# Patient Record
Sex: FEMALE | Employment: FULL TIME | URBAN - METROPOLITAN AREA
[De-identification: names, ages, dates, MRNs, and addresses within clinical notes are randomized per-mention and may not be internally consistent; named-entity substitution may affect disease eponyms.]

---

## 2018-03-12 ENCOUNTER — OFFICE VISIT (OUTPATIENT)
Dept: FAMILY MEDICINE CLINIC | Age: 58
End: 2018-03-12

## 2018-03-12 VITALS
OXYGEN SATURATION: 95 % | RESPIRATION RATE: 18 BRPM | SYSTOLIC BLOOD PRESSURE: 117 MMHG | DIASTOLIC BLOOD PRESSURE: 76 MMHG | HEIGHT: 65 IN | BODY MASS INDEX: 36.15 KG/M2 | TEMPERATURE: 98.4 F | HEART RATE: 73 BPM | WEIGHT: 217 LBS

## 2018-03-12 DIAGNOSIS — G89.29 CHRONIC LEFT SHOULDER PAIN: Primary | ICD-10-CM

## 2018-03-12 DIAGNOSIS — M25.512 CHRONIC LEFT SHOULDER PAIN: Primary | ICD-10-CM

## 2018-03-12 NOTE — PROGRESS NOTES
Chief Complaint   Patient presents with    Shoulder Pain     X3 months,no injury or trauma noted     1. Have you been to the ER, urgent care clinic since your last visit? Hospitalized since your last visit? Yes When: 3/5/18,[facility]fell and hit head    2. Have you seen or consulted any other health care providers outside of the [address] [place] [address] since your last visit? Include any pap smears or colon screening.  No

## 2018-03-12 NOTE — PATIENT INSTRUCTIONS
Shoulder Arthritis: Exercises  Your Care Instructions  Here are some examples of typical rehabilitation exercises for your condition. Start each exercise slowly. Ease off the exercise if you start to have pain. Your doctor or physical therapist will tell you when you can start these exercises and which ones will work best for you. How to do the exercises  Shoulder flexion (lying down)    To make a wand for this exercise, use a piece of PVC pipe or a broom handle with the broom removed. Make the wand about a foot wider than your shoulders. 1. Lie on your back, holding a wand with both hands. Your palms should face down as you hold the wand. 2. Keeping your elbows straight, slowly raise your arms over your head. Raise them until you feel a stretch in your shoulders, upper back, and chest.  3. Hold for 15 to 30 seconds. 4. Repeat 2 to 4 times. Shoulder rotation (lying down)    To make a wand for this exercise, use a piece of PVC pipe or a broom handle with the broom removed. Make the wand about a foot wider than your shoulders. 1. Lie on your back. Hold a wand with both hands with your elbows bent and palms up. 2. Keep your elbows close to your body, and move the wand across your body toward the sore arm. 3. Hold for 8 to 12 seconds. 4. Repeat 2 to 4 times. Shoulder internal rotation with towel    1. Hold a towel above and behind your head with the arm that is not sore. 2. With your sore arm, reach behind your back and grasp the towel. 3. With the arm above your head, pull the towel upward. Do this until you feel a stretch on the front and outside of your sore shoulder. 4. Hold 15 to 30 seconds. 5. Repeat 2 to 4 times. Shoulder blade squeeze    1. Stand with your arms at your sides, and squeeze your shoulder blades together. Do not raise your shoulders up as you squeeze. 2. Hold 6 seconds. 3. Repeat 8 to 12 times.   Resisted rows    For this exercise, you will need elastic exercise material, such as surgical tubing or Thera-Band. 1. Put the band around a solid object at about waist level. (A bedpost will work well.) Each hand should hold an end of the band. 2. With your elbows at your sides and bent to 90 degrees, pull the band back. Your shoulder blades should move toward each other. Return to the starting position. 3. Repeat 8 to 12 times. External rotator strengthening exercise    1. Start by tying a piece of elastic exercise material to a doorknob. You can use surgical tubing or Thera-Band. (You may also hold one end of the band in each hand.)  2. Stand or sit with your shoulder relaxed and your elbow bent 90 degrees. Your upper arm should rest comfortably against your side. Squeeze a rolled towel between your elbow and your body for comfort. This will help keep your arm at your side. 3. Hold one end of the elastic band with the hand of the painful arm. 4. Start with your forearm across your belly. Slowly rotate the forearm out away from your body. Keep your elbow and upper arm tucked against the towel roll or the side of your body until you begin to feel tightness in your shoulder. Slowly move your arm back to where you started. 5. Repeat 8 to 12 times. Internal rotator strengthening exercise    1. Start by tying a piece of elastic exercise material to a doorknob. You can use surgical tubing or Thera-Band. 2. Stand or sit with your shoulder relaxed and your elbow bent 90 degrees. Your upper arm should rest comfortably against your side. Squeeze a rolled towel between your elbow and your body for comfort. This will help keep your arm at your side. 3. Hold one end of the elastic band in the hand of the painful arm. 4. Slowly rotate your forearm toward your body until it touches your belly. Slowly move it back to where you started. 5. Keep your elbow and upper arm firmly tucked against the towel roll or at your side. 6. Repeat 8 to 12 times.   Pendulum swing    If you have pain in your back, do not do this exercise. 1. Hold on to a table or the back of a chair with your good arm. Then bend forward a little and let your sore arm hang straight down. This exercise does not use the arm muscles. Rather, use your legs and your hips to create movement that makes your arm swing freely. 2. Use the movement from your hips and legs to guide the slightly swinging arm back and forth like a pendulum (or elephant trunk). Then guide it in circles that start small (about the size of a dinner plate). Make the circles a bit larger each day, as your pain allows. 3. Do this exercise for 5 minutes, 5 to 7 times each day. 4. As you have less pain, try bending over a little farther to do this exercise. This will increase the amount of movement at your shoulder. Follow-up care is a key part of your treatment and safety. Be sure to make and go to all appointments, and call your doctor if you are having problems. It's also a good idea to know your test results and keep a list of the medicines you take. Where can you learn more? Go to http://vaishnavi-lorna.info/. Enter H562 in the search box to learn more about \"Shoulder Arthritis: Exercises. \"  Current as of: March 21, 2017  Content Version: 11.4  © 1388-8498 Healthwise, Incorporated. Care instructions adapted under license by "[employer] (which disclaims liability or warranty for this information). If you have questions about a medical condition or this instruction, always ask your healthcare professional. [place] [address] any warranty or liability for your use of this information.     Use Aleve twice daily for 5-7 days

## 2018-03-12 NOTE — PROGRESS NOTES
HPI     Chief Complaint   Patient presents with    Shoulder Pain     X3 months,no injury or trauma noted     She is a 62 y.o. female who presents for left shoulder pain x3 months. Pain is present in the [facility] region. It is achy in character. No radiation of the pain. Pain is present everyday. Denies trauma. Pain is worse when she tries to put her coat on. Pain is 3/10. No numbness or tingling. No weakness. Has not been taking pain medications. Review of Systems   Constitutional: Negative for fatigue and fever. Respiratory: Negative for shortness of breath. Cardiovascular: Negative for chest pain. Gastrointestinal: Negative for abdominal pain. Musculoskeletal: Positive for arthralgias. Negative for myalgias, neck pain and neck stiffness. Skin: Negative for wound. Neurological: Negative for light-headedness and headaches. Physical Exam:  Visit Vitals    /76 (BP 1 Location: Right arm, BP Patient Position: Sitting)    Pulse 73    Temp 98.4 °F (36.9 °C) (Oral)    Resp 18    Ht 5' 4.5\" (1.638 m)    Wt 217 lb (98.4 kg)    LMP 07/01/2011    SpO2 95%    BMI 36.67 kg/m2     Physical Exam   Constitutional: She is oriented to person, place, and time. She appears well-developed and well-nourished. HENT:   Head: Normocephalic and atraumatic. Nose: Nose normal.   Eyes: Conjunctivae are normal.   Cardiovascular: Normal rate and regular rhythm. No murmur heard. Pulmonary/Chest: Effort normal and breath sounds normal. She has no wheezes. She has no rales. Abdominal: Soft. Bowel sounds are normal. She exhibits no distension. There is no tenderness. Musculoskeletal: She exhibits no edema. Neurological: She is alert and oriented to person, place, and time. Skin: Skin is warm and dry.      Shoulder: Left  Deformity: None    ROM:  Forward Flexion: Active: 180   Passive: 180  ER (0): Active: 45  Passive: 45  IR (0): Active: Behind the body - pain present  Abduction: Active: 180 Passive: 180    Palpation:  AC tenderness: present  SC tenderness: None  Clavicle tenderness: None  Biceps tenderness: None    Strength (0-5/5):   strength 5/5  External rotation: 5/5  Internal rotation: 5/5    Rotator Cuff Exam:  Neers sign: negative  Rocha sign: negative    Biceps/Labrum/AC Exam:  Speeds Test: Negative    Instability:  Anterior apprehension: Negative    Neuro/Vascular:  Pulses intact, no edema, and neurologically intact    C-Spine:  Cervical motion: FROM without pain. Cervical tenderness: None  Spurlings test: Negative           Assessment / Plan   Diagnoses and all orders for this visit:    1. Chronic left shoulder pain   -     XR SHOULDER RT AP/LAT MIN 2 V - Acromioclavicular osteoarthritis. - Aleve BID  - Shoulder exercises and referral to PT     Follow-up Disposition:  Return if symptoms worsen or fail to improve. I have discussed the diagnosis with the patient and the intended plan as seen in the above orders. The patient has received an after-visit summary and questions were answered concerning future plans. I have discussed medication side effects and warnings with the patient as well.     Talisha George MD  Family Medicine Resident

## 2018-03-12 NOTE — MR AVS SNAPSHOT
[address] 
[phone] Patient: Mena Plata MRN: [id number] CEK:[id number] Visit Information Date & Time Provider Department Dept. Phone Encounter #  
 3/12/2018  2:25 PM [facility], [address] [place] [address] [phone] [id number] Follow-up Instructions Return if symptoms worsen or fail to improve. Upcoming Health Maintenance Date Due FOBT Q 1 YEAR AGE 50-75 4/23/2010 BREAST CANCER SCRN MAMMOGRAM 3/29/2013 PAP AKA CERVICAL CYTOLOGY 4/1/2014 Influenza Age 5 to Adult 8/1/2017 DTaP/Tdap/Td series (2 - Td) 12/28/2026 Allergies as of 3/12/2018  Review Complete On: 3/12/2018 By: [address], MD  
 No Known Allergies Current Immunizations  Never Reviewed No immunizations on file. Not reviewed this visit You Were Diagnosed With   
  
 Codes Comments Chronic left shoulder pain    -  Primary ICD-10-CM: M25.512, Z20.07 ICD-9-CM: 719.41, 338.29 Vitals BP Pulse Temp Resp Height(growth percentile) Weight(growth percentile) 117/76 (BP 1 Location: Right arm, BP Patient Position: Sitting) 73 98.4 °F (36.9 °C) (Oral) 18 5' 4.5\" (1.638 m) 217 lb (98.4 kg) LMP SpO2 BMI OB Status Smoking Status 07/01/2011 95% 36.67 kg/m2 Postmenopausal Never Smoker Vitals History BMI and BSA Data Body Mass Index Body Surface Area  
 36.67 kg/m 2 2.12 m 2 Preferred Pharmacy Pharmacy Name Phone [facility] [place] AT [facility] [phone] Your Updated Medication List  
  
   
This list is accurate as of 3/12/18  2:53 PM.  Always use your most recent med list.  
  
  
  
  
 CLARITIN 10 mg tablet Generic drug:  loratadine Take 10 mg by mouth daily.   
  
 pscybwamf-Qtioyxhz-Acuus-W.Pet 0.25-1 % rectal cream  
 Commonly known as:  PREPARATION H MAXIMUM STRENGTH Apply  to affected area as needed for Hemorrhoids. Follow-up Instructions Return if symptoms worsen or fail to improve. To-Do List   
 03/12/2018 Imaging:  XR SHOULDER RT AP/LAT MIN 2 V Patient Instructions Shoulder Arthritis: Exercises Your Care Instructions Here are some examples of typical rehabilitation exercises for your condition. Start each exercise slowly. Ease off the exercise if you start to have pain. Your doctor or physical therapist will tell you when you can start these exercises and which ones will work best for you. How to do the exercises Shoulder flexion (lying down) To make a wand for this exercise, use a piece of PVC pipe or a broom handle with the broom removed. Make the wand about a foot wider than your shoulders. 1. Lie on your back, holding a wand with both hands. Your palms should face down as you hold the wand. 2. Keeping your elbows straight, slowly raise your arms over your head. Raise them until you feel a stretch in your shoulders, upper back, and chest. 
3. Hold for 15 to 30 seconds. 4. Repeat 2 to 4 times. Shoulder rotation (lying down) To make a wand for this exercise, use a piece of PVC pipe or a broom handle with the broom removed. Make the wand about a foot wider than your shoulders. 1. Lie on your back. Hold a wand with both hands with your elbows bent and palms up. 2. Keep your elbows close to your body, and move the wand across your body toward the sore arm. 3. Hold for 8 to 12 seconds. 4. Repeat 2 to 4 times. Shoulder internal rotation with towel 1. Hold a towel above and behind your head with the arm that is not sore. 2. With your sore arm, reach behind your back and grasp the towel. 3. With the arm above your head, pull the towel upward. Do this until you feel a stretch on the front and outside of your sore shoulder. 4. Hold 15 to 30 seconds. 5. Repeat 2 to 4 times. Shoulder blade squeeze 1. Stand with your arms at your sides, and squeeze your shoulder blades together. Do not raise your shoulders up as you squeeze. 2. Hold 6 seconds. 3. Repeat 8 to 12 times. Resisted rows For this exercise, you will need elastic exercise material, such as surgical tubing or Thera-Band. 1. Put the band around a solid object at about waist level. (A bedpost will work well.) Each hand should hold an end of the band. 2. With your elbows at your sides and bent to 90 degrees, pull the band back. Your shoulder blades should move toward each other. Return to the starting position. 3. Repeat 8 to 12 times. External rotator strengthening exercise 1. Start by tying a piece of elastic exercise material to a doorknob. You can use surgical tubing or Thera-Band. (You may also hold one end of the band in each hand.) 2. Stand or sit with your shoulder relaxed and your elbow bent 90 degrees. Your upper arm should rest comfortably against your side. Squeeze a rolled towel between your elbow and your body for comfort. This will help keep your arm at your side. 3. Hold one end of the elastic band with the hand of the painful arm. 4. Start with your forearm across your belly. Slowly rotate the forearm out away from your body. Keep your elbow and upper arm tucked against the towel roll or the side of your body until you begin to feel tightness in your shoulder. Slowly move your arm back to where you started. 5. Repeat 8 to 12 times. Internal rotator strengthening exercise 1. Start by tying a piece of elastic exercise material to a doorknob. You can use surgical tubing or Thera-Band. 2. Stand or sit with your shoulder relaxed and your elbow bent 90 degrees. Your upper arm should rest comfortably against your side. Squeeze a rolled towel between your elbow and your body for comfort. This will help keep your arm at your side. 3. Hold one end of the elastic band in the hand of the painful arm. 4. Slowly rotate your forearm toward your body until it touches your belly. Slowly move it back to where you started. 5. Keep your elbow and upper arm firmly tucked against the towel roll or at your side. 6. Repeat 8 to 12 times. Pendulum swing If you have pain in your back, do not do this exercise. 1. Hold on to a table or the back of a chair with your good arm. Then bend forward a little and let your sore arm hang straight down. This exercise does not use the arm muscles. Rather, use your legs and your hips to create movement that makes your arm swing freely. 2. Use the movement from your hips and legs to guide the slightly swinging arm back and forth like a pendulum (or elephant trunk). Then guide it in circles that start small (about the size of a dinner plate). Make the circles a bit larger each day, as your pain allows. 3. Do this exercise for 5 minutes, 5 to 7 times each day. 4. As you have less pain, try bending over a little farther to do this exercise. This will increase the amount of movement at your shoulder. Follow-up care is a key part of your treatment and safety. Be sure to make and go to all appointments, and call your doctor if you are having problems. It's also a good idea to know your test results and keep a list of the medicines you take. Where can you learn more? Go to http://vaishnavi-lorna.info/. Enter H562 in the search box to learn more about \"Shoulder Arthritis: Exercises. \" Current as of: March 21, 2017 Content Version: 11.4 © 7449-2846 [employer]. Care instructions adapted under license by [employer] (which disclaims liability or warranty for this information). If you have questions about a medical condition or this instruction, always ask your healthcare professional. [address] any warranty or liability for your use of this information. Use Aleve twice daily for 5-7 days Introducing [facility]! Chantel Randall introduces Quietyme patient portal. Now you can access parts of your medical record, email your doctor's office, and request medication refills online. 1. In your internet browser, go to [email or web address]. "[employer]/B[employer]ant 2. Click on the First Time User? Click Here link in the Sign In box. You will see the New Member Sign Up page. 3. Enter your Quietyme Access Code exactly as it appears below. You will not need to use this code after youve completed the sign-up process. If you do not sign up before the expiration date, you must request a new code. · Quietyme Access Code: [email or web address] Expires: 6/10/2018  2:24 PM 
 
4. Enter the last four digits of your Social Security Number (xxxx) and Date of Birth (mm/dd/yyyy) as indicated and click Submit. You will be taken to the next sign-up page. 5. Create a Quietyme ID. This will be your Quietyme login ID and cannot be changed, so think of one that is secure and easy to remember. 6. Create a Quietyme password. You can change your password at any time. 7. Enter your Password Reset Question and Answer. This can be used at a later time if you forget your password. 8. Enter your e-mail address. You will receive e-mail notification when new information is available in [address] 9. Click Sign Up. You can now view and download portions of your medical record. 10. Click the Download Summary menu link to download a portable copy of your medical information. If you have questions, please visit the Frequently Asked Questions section of the Quietyme website. Remember, Quietyme is NOT to be used for urgent needs. For medical emergencies, dial 911. Now available from your iPhone and Android! Please provide this summary of care documentation to your next provider. Your primary care clinician is listed as Kishore Aguilar.  If you have any questions after today's visit, please call [phone].

## 2020-02-26 NOTE — PROGRESS NOTES
Assessment and Plan   Diagnoses and all orders for this visit:    1. Obesity (BMI 30-39.9)  -     HEMOGLOBIN A1C WITH EAG; Future  -     METABOLIC PANEL, COMPREHENSIVE; Future  -     LIPID PANEL; Future  Encouraged to start exercising and watching her diet    2. Leukocytosis, unspecified type  -     CBC WITH AUTOMATED DIFF; Future  Noted on previous labs. Will repeat today    3. Elevated blood-pressure reading, without diagnosis of hypertension  Advised patient to monitor blood pressure at home. If persistently elevated, consider blood pressure medication    4. Routine adult health maintenance  [facility]. Reports having an abnormal Pap-needs to follow-up on. Has a mammo-scheduled in May. Did have a colonoscopy done but cannot remember where she had a done         Return to clinic: 1 month for blood pressure, get colonoscopy report  Lymphocytosis. . Prediabetic. Check a peripheral smear next visit. Discussed starting high intensity statin. Consider starting metformin for prediabetes. We will discuss these recommendations at next visit    Kendrick Galdamez MD  [facility]  2/27/2020    Future Appointments   Date Time Provider Shania Mejíai   2/46/1380  8:95 PM Agueda Saldaña MD [address]        History of Present Illness   Chief Complaint   Establish care    Judd Noble is a 61 y.o. female     Migraines-chronic issue. Usually occurs around her eyes. Occurs about once a month. Obesity-recently joined a gym. Interested in starting exercise    Elevated blood pressure-history of preeclampsia with her first pregnancy. Denies any chest pain, shortness of breath, lightheadedness, dizziness. Review of Systems   Constitutional: Negative for chills and fever. HENT: Negative for hearing loss. Eyes: Negative for blurred vision. Respiratory: Negative for shortness of breath.     Cardiovascular: Negative for chest pain. Gastrointestinal: Negative for abdominal pain, blood in stool, constipation, diarrhea, melena, nausea and vomiting. Genitourinary: Negative for dysuria and hematuria. Musculoskeletal: Negative for joint pain. Skin: Negative for rash. Neurological: Negative for headaches. Past Medical History   No Known Allergies     Current Outpatient Medications   Medication Sig    fluocinoNIDE (LIDEX) 0.05 % external solution Apply  to affected area two (2) times daily as needed.  omega 3-dha-epa-fish oil (FISH OIL) 100-160-1,000 mg cap Take  by mouth.  multivitamin (ONE A DAY) tablet Take 1 Tab by mouth daily.  Biotin 2,500 mcg cap Take  by mouth.  b complex vitamins tablet Take 1 Tab by mouth daily.  loratadine (CLARITIN) 10 mg tablet Take 10 mg by mouth daily. No current facility-administered medications for this visit. Patient Active Problem List   Diagnosis Code    Migraines V72.315    Hemorrhoids K64.9    Environmental allergies Z91.09    Hypercholesterolemia E78.00     Past Surgical History:   Procedure Laterality Date    HX  SECTION      x3    HX TUBAL LIGATION        Social History     Tobacco Use    Smoking status: Former Smoker    Smokeless tobacco: Never Used    Tobacco comment: quit at age 32   Substance Use Topics    Alcohol use:  Yes     Alcohol/week: 0.8 standard drinks     Types: 1 Glasses of wine per week     Comment: occas wine rare      Family History   Problem Relation Age of Onset    Stroke Mother     Elevated Lipids Mother     Heart Disease Father     Diabetes Father     Hypertension Father     Cancer Maternal Grandmother         breast cancer        Physical Exam   Vitals:       Visit Vitals  /86 (BP 1 Location: Left arm, BP Patient Position: Sitting)   Pulse 70   Temp 98.7 °F (37.1 °C) (Oral)   Resp 18   Ht 5' 4.5\" (1.638 m)   Wt 218 lb 8 oz (99.1 kg)   LMP 2011   SpO2 96%   BMI 36.93 kg/m² Physical Exam  Constitutional:       General: She is not in acute distress. Appearance: She is well-developed. HENT:      Right Ear: Tympanic membrane, ear canal and external ear normal.      Left Ear: Tympanic membrane, ear canal and external ear normal.      Nose: Nose normal.      Mouth/Throat:      Mouth: Mucous membranes are moist.      Pharynx: No posterior oropharyngeal erythema. Eyes:      Conjunctiva/sclera: Conjunctivae normal.      Pupils: Pupils are equal, round, and reactive to light. Neck:      Musculoskeletal: Neck supple. Cardiovascular:      Rate and Rhythm: Normal rate and regular rhythm. Pulses: Normal pulses. Heart sounds: No murmur. No friction rub. No gallop. Pulmonary:      Effort: No respiratory distress. Breath sounds: No wheezing or rales. Abdominal:      General: Bowel sounds are normal. There is no distension. Palpations: Abdomen is soft. There is no hepatomegaly, splenomegaly or mass. Tenderness: There is no abdominal tenderness. Hernia: No hernia is present. Skin:     General: Skin is warm. Findings: No rash. Neurological:      Mental Status: She is alert. Psychiatric:         Mood and Affect: Mood normal.         Thought Content:  Thought content normal.         Judgment: Judgment normal.          Voice recognition software is utilized and this note may contain transcription errors

## 2020-02-27 ENCOUNTER — OFFICE VISIT (OUTPATIENT)
Dept: INTERNAL MEDICINE CLINIC | Age: 60
End: 2020-02-27

## 2020-02-27 VITALS
RESPIRATION RATE: 18 BRPM | BODY MASS INDEX: 36.4 KG/M2 | WEIGHT: 218.5 LBS | TEMPERATURE: 98.7 F | OXYGEN SATURATION: 96 % | DIASTOLIC BLOOD PRESSURE: 86 MMHG | HEIGHT: 65 IN | SYSTOLIC BLOOD PRESSURE: 146 MMHG | HEART RATE: 70 BPM

## 2020-02-27 DIAGNOSIS — D72.829 LEUKOCYTOSIS, UNSPECIFIED TYPE: ICD-10-CM

## 2020-02-27 DIAGNOSIS — E66.9 OBESITY (BMI 30-39.9): ICD-10-CM

## 2020-02-27 DIAGNOSIS — R03.0 ELEVATED BLOOD-PRESSURE READING, WITHOUT DIAGNOSIS OF HYPERTENSION: ICD-10-CM

## 2020-02-27 DIAGNOSIS — Z00.00 ROUTINE ADULT HEALTH MAINTENANCE: ICD-10-CM

## 2020-02-27 DIAGNOSIS — E66.01 SEVERE OBESITY (HCC): ICD-10-CM

## 2020-02-27 DIAGNOSIS — E66.9 OBESITY (BMI 30-39.9): Primary | ICD-10-CM

## 2020-02-27 PROBLEM — E78.00 HYPERCHOLESTEROLEMIA: Status: ACTIVE | Noted: 2020-02-27

## 2020-02-27 PROBLEM — Z91.09 ENVIRONMENTAL ALLERGIES: Status: ACTIVE | Noted: 2020-02-27

## 2020-02-27 RX ORDER — FLUOCINONIDE TOPICAL SOLUTION USP, 0.05% 0.5 MG/ML
SOLUTION TOPICAL
COMMUNITY

## 2020-02-27 RX ORDER — BISMUTH SUBSALICYLATE 262 MG
1 TABLET,CHEWABLE ORAL DAILY
COMMUNITY

## 2020-02-27 RX ORDER — GLUCOSAMINE/CHONDR SU A SOD 750-600 MG
TABLET ORAL
COMMUNITY
End: 2022-08-23

## 2020-02-27 RX ORDER — MULTIVIT WITH MINERALS/HERBS
1 TABLET ORAL DAILY
COMMUNITY

## 2020-02-28 LAB
ALBUMIN SERPL-MCNC: 4.6 G/DL (ref 3.8–4.9)
ALBUMIN/GLOB SERPL: 1.7 {RATIO} (ref 1.2–2.2)
ALP SERPL-CCNC: 80 IU/L (ref 39–117)
ALT SERPL-CCNC: 12 IU/L (ref 0–32)
AST SERPL-CCNC: 16 IU/L (ref 0–40)
BASOPHILS # BLD AUTO: 0.1 X10E3/UL (ref 0–0.2)
BASOPHILS NFR BLD AUTO: 1 %
BILIRUB SERPL-MCNC: 0.4 MG/DL (ref 0–1.2)
BUN SERPL-MCNC: 9 MG/DL (ref 6–24)
BUN/CREAT SERPL: 10 (ref 9–23)
CALCIUM SERPL-MCNC: 9.9 MG/DL (ref 8.7–10.2)
CHLORIDE SERPL-SCNC: 104 MMOL/L (ref 96–106)
CHOLEST SERPL-MCNC: 314 MG/DL (ref 100–199)
CO2 SERPL-SCNC: 25 MMOL/L (ref 20–29)
CREAT SERPL-MCNC: 0.89 MG/DL (ref 0.57–1)
EOSINOPHIL # BLD AUTO: 0.1 X10E3/UL (ref 0–0.4)
EOSINOPHIL NFR BLD AUTO: 1 %
ERYTHROCYTE [DISTWIDTH] IN BLOOD BY AUTOMATED COUNT: 12.9 % (ref 11.7–15.4)
EST. AVERAGE GLUCOSE BLD GHB EST-MCNC: 120 MG/DL
GLOBULIN SER CALC-MCNC: 2.7 G/DL (ref 1.5–4.5)
GLUCOSE SERPL-MCNC: 89 MG/DL (ref 65–99)
HBA1C MFR BLD: 5.8 % (ref 4.8–5.6)
HCT VFR BLD AUTO: 42.5 % (ref 34–46.6)
HDLC SERPL-MCNC: 62 MG/DL
HGB BLD-MCNC: 14.3 G/DL (ref 11.1–15.9)
IMM GRANULOCYTES # BLD AUTO: 0 X10E3/UL (ref 0–0.1)
IMM GRANULOCYTES NFR BLD AUTO: 0 %
INTERPRETATION, 910389: NORMAL
LDLC SERPL CALC-MCNC: 212 MG/DL (ref 0–99)
LYMPHOCYTES # BLD AUTO: 4.7 X10E3/UL (ref 0.7–3.1)
LYMPHOCYTES NFR BLD AUTO: 42 %
MCH RBC QN AUTO: 29.2 PG (ref 26.6–33)
MCHC RBC AUTO-ENTMCNC: 33.6 G/DL (ref 31.5–35.7)
MCV RBC AUTO: 87 FL (ref 79–97)
MONOCYTES # BLD AUTO: 0.7 X10E3/UL (ref 0.1–0.9)
MONOCYTES NFR BLD AUTO: 6 %
NEUTROPHILS # BLD AUTO: 5.6 X10E3/UL (ref 1.4–7)
NEUTROPHILS NFR BLD AUTO: 50 %
PLATELET # BLD AUTO: 304 X10E3/UL (ref 150–450)
POTASSIUM SERPL-SCNC: 4.8 MMOL/L (ref 3.5–5.2)
PROT SERPL-MCNC: 7.3 G/DL (ref 6–8.5)
RBC # BLD AUTO: 4.9 X10E6/UL (ref 3.77–5.28)
SODIUM SERPL-SCNC: 144 MMOL/L (ref 134–144)
TRIGL SERPL-MCNC: 201 MG/DL (ref 0–149)
VLDLC SERPL CALC-MCNC: 40 MG/DL (ref 5–40)
WBC # BLD AUTO: 11.2 X10E3/UL (ref 3.4–10.8)

## 2020-02-28 NOTE — PROGRESS NOTES
Letter sent. Labs normal except for the following:  Lymphocytosis. . Prediabetic. Check a peripheral smear next visit. Discussed starting high intensity statin. Consider starting metformin for prediabetes.   We will discuss these recommendations at next visit

## 2020-12-15 ENCOUNTER — PATIENT MESSAGE (OUTPATIENT)
Dept: INTERNAL MEDICINE CLINIC | Age: 60
End: 2020-12-15

## 2021-01-06 ENCOUNTER — PATIENT MESSAGE (OUTPATIENT)
Dept: INTERNAL MEDICINE CLINIC | Age: 61
End: 2021-01-06

## 2021-03-01 NOTE — PROGRESS NOTES
Assessment and Plan   Diagnoses and all orders for this visit:    1. Well adult exam  Discussed lifestyle changes. Reports she did well when adhering to an anti-inflammatory diet. She enjoyed that diet but found it difficult due to need to prepare fresh food. Counseled on COVID-19 vaccine. Counseled on shingles vaccine. Reports she had an abnormal Pap and was supposed to go back for repeat but did not go due to Covid. Recommend following up with her OB/GYN. 2. Leukocytosis, unspecified type  -     CBC WITH AUTOMATED DIFF; Future  -     PERIPHERAL SMEAR; Future  Noted on previous labs. Feeling well today. Will repeat    3. Mixed hyperlipidemia  -     HEMOGLOBIN A1C WITH EAG; Future  -     METABOLIC PANEL, COMPREHENSIVE; Future  -     LIPID PANEL; Future  Discussed possibly starting cholesterol medications based on what her labs look like today. Discussed Crestor. 4. Encounter for screening mammogram for malignant neoplasm of breast  -     [facility] MAMMO BI SCREENING INCL CAD; Future    5. Essential hypertension  Blood pressure elevated today. Due to her preference to minimize medications, recommend a trial of lifestyle changes to see if that will help with her blood pressure. Otherwise, may need to consider blood pressure medications in the future. Discussed hydrochlorothiazide versus amlodipine    Benefits, risks, possible drug interactions, and side effects of all new medications were reviewed with the patient. Pt verbalized understanding. Return to clinic: Pending labs    An electronic signature was used to authenticate this note. Uma Alford MD  [facility]  3/2/2021    No future appointments. History of Present Illness   Chief Complaint   Physical    Michael Camara is a 61 y.o. female         Review of Systems   Constitutional: Negative for chills and fever. HENT: Negative for hearing loss. Eyes: Negative for blurred vision.    Respiratory: Negative for shortness of breath.    Cardiovascular: Negative for chest pain.   Gastrointestinal: Negative for abdominal pain, blood in stool, constipation, diarrhea, melena, nausea and vomiting.   Genitourinary: Negative for dysuria and hematuria.   Musculoskeletal: Negative for joint pain.   Skin: Negative for rash.   Neurological: Negative for headaches.        Past Medical History   No Known Allergies     Current Outpatient Medications   Medication Sig   • fluocinoNIDE (LIDEX) 0.05 % external solution Apply  to affected area two (2) times daily as needed.   • omega 3-dha-epa-fish oil (FISH OIL) 100-160-1,000 mg cap Take  by mouth.   • multivitamin (ONE A DAY) tablet Take 1 Tab by mouth daily.   • Biotin 2,500 mcg cap Take  by mouth.   • b complex vitamins tablet Take 1 Tab by mouth daily.   • loratadine (CLARITIN) 10 mg tablet Take 10 mg by mouth daily.     No current facility-administered medications for this visit.           Patient Active Problem List   Diagnosis Code   • Migraines G43.909   • Hemorrhoids K64.9   • Environmental allergies Z91.09   • Severe obesity (HCC) E66.01   • Prediabetes R73.03   • Leukocytosis, unspecified type D72.829   • Mixed hyperlipidemia E78.2   • Essential hypertension I10     Past Surgical History:   Procedure Laterality Date   • HX  SECTION      x3   • HX TUBAL LIGATION        Social History     Tobacco Use   • Smoking status: Former Smoker   • Smokeless tobacco: Never Used   • Tobacco comment: quit at age 26   Substance Use Topics   • Alcohol use: Not Currently     Comment: wine 1-2x/yr      Family History   Problem Relation Age of Onset   • Stroke Mother    • Elevated Lipids Mother    • Heart Disease Father    • Diabetes Father    • Hypertension Father    • Cancer Maternal Grandmother         breast cancer        Physical Exam   Vitals:       Visit Vitals  BP (!) 147/86   Pulse 74   Temp 97.6 °F (36.4 °C) (Oral)   Resp 20   Ht 5' 4\" (1.626 m)   Wt 226 lb (102.5 kg)  LMP 07/01/2011   SpO2 98%   BMI 38.79 kg/m²        Physical Exam  Constitutional:       General: She is not in acute distress. Appearance: She is well-developed. HENT:      Right Ear: Tympanic membrane, ear canal and external ear normal.      Left Ear: Tympanic membrane, ear canal and external ear normal.   Eyes:      Extraocular Movements: Extraocular movements intact. Conjunctiva/sclera: Conjunctivae normal.   Neck:      Musculoskeletal: Neck supple. Cardiovascular:      Rate and Rhythm: Normal rate and regular rhythm. Pulses: Normal pulses. Heart sounds: No murmur. No friction rub. No gallop. Pulmonary:      Effort: No respiratory distress. Breath sounds: No wheezing, rhonchi or rales. Abdominal:      General: Bowel sounds are normal. There is no distension. Palpations: Abdomen is soft. There is no hepatomegaly, splenomegaly or mass. Tenderness: There is no abdominal tenderness. There is no guarding. Skin:     General: Skin is warm. Findings: No rash. Neurological:      Mental Status: She is alert.

## 2021-03-02 ENCOUNTER — OFFICE VISIT (OUTPATIENT)
Dept: INTERNAL MEDICINE CLINIC | Age: 61
End: 2021-03-02
Payer: COMMERCIAL

## 2021-03-02 VITALS
HEART RATE: 74 BPM | BODY MASS INDEX: 38.58 KG/M2 | TEMPERATURE: 97.6 F | RESPIRATION RATE: 20 BRPM | DIASTOLIC BLOOD PRESSURE: 86 MMHG | OXYGEN SATURATION: 98 % | WEIGHT: 226 LBS | HEIGHT: 64 IN | SYSTOLIC BLOOD PRESSURE: 147 MMHG

## 2021-03-02 DIAGNOSIS — E78.2 MIXED HYPERLIPIDEMIA: ICD-10-CM

## 2021-03-02 DIAGNOSIS — D72.829 LEUKOCYTOSIS, UNSPECIFIED TYPE: ICD-10-CM

## 2021-03-02 DIAGNOSIS — Z00.00 WELL ADULT EXAM: Primary | ICD-10-CM

## 2021-03-02 DIAGNOSIS — I10 ESSENTIAL HYPERTENSION: ICD-10-CM

## 2021-03-02 DIAGNOSIS — Z12.31 ENCOUNTER FOR SCREENING MAMMOGRAM FOR MALIGNANT NEOPLASM OF BREAST: ICD-10-CM

## 2021-03-02 PROBLEM — R73.03 PREDIABETES: Status: ACTIVE | Noted: 2021-03-02

## 2021-03-02 LAB
COMMENT, HOLDF: NORMAL
SAMPLES BEING HELD,HOLD: NORMAL

## 2021-03-02 PROCEDURE — 99396 PREV VISIT EST AGE 40-64: CPT | Performed by: INTERNAL MEDICINE

## 2021-03-02 NOTE — PATIENT INSTRUCTIONS
We are so excited that you want to have the vaccine! It is the main way we can work our way through this pandemic. The situation is evolving on a daily basis. Currently we will not be giving the vaccine in our office. First you need to make sure you have signed up with the health department. They will alert you when your wave is ready. There will be different pharmacies that are contracted to administer the vaccine which you can go to when it is your turn. This information should be available as to where to go once your wave is ready. Also we are not aware of any documentation needed for preexisting conditions. We also do not have the ability to change someone's \" wave\". [facility] has successfully begun vaccination of our [facility] [facility] patient population at centrally located spaces NOT doctor's office , in accordance with [address] [place] [address] guidelines. At this time, our ability to open additional appointments is constrained by vaccine availability. We continue, however, to encourage eligible patients to register for a [employer] account as the primary method of communication with our patients; we are also working to address the limitations of Hub[employer] for high risk/older aged populations. In meantime how do you stay in loop on how to get a vaccine. .. Watch for a [employer] message that will let you know when you are eligible to schedule an appointment for a vaccine. If you dont have MyChart, go to Matchbook and click Patient Portal to sign up. You can also watch our website for updates: [email or web address] Community members should complete the interest form on the [employer] [place] [address] website and monitor their local health district website to learn more about additional vaccine distribution opportunities. As vaccine supplies increase in the weeks to come, we are looking at opportunities to open up additional capacity through our medical group staff and providers.

## 2021-03-03 LAB
ALBUMIN SERPL-MCNC: 3.8 G/DL (ref 3.5–5)
ALBUMIN/GLOB SERPL: 0.9 {RATIO} (ref 1.1–2.2)
ALP SERPL-CCNC: 78 U/L (ref 45–117)
ALT SERPL-CCNC: 25 U/L (ref 12–78)
ANION GAP SERPL CALC-SCNC: 4 MMOL/L (ref 5–15)
AST SERPL-CCNC: 18 U/L (ref 15–37)
BASOPHILS # BLD: 0.1 K/UL (ref 0–0.1)
BASOPHILS NFR BLD: 1 % (ref 0–1)
BILIRUB SERPL-MCNC: 0.4 MG/DL (ref 0.2–1)
BUN SERPL-MCNC: 18 MG/DL (ref 6–20)
BUN/CREAT SERPL: 17 (ref 12–20)
CALCIUM SERPL-MCNC: 9 MG/DL (ref 8.5–10.1)
CHLORIDE SERPL-SCNC: 110 MMOL/L (ref 97–108)
CHOLEST SERPL-MCNC: 344 MG/DL
CO2 SERPL-SCNC: 27 MMOL/L (ref 21–32)
CREAT SERPL-MCNC: 1.07 MG/DL (ref 0.55–1.02)
DIFFERENTIAL METHOD BLD: ABNORMAL
EOSINOPHIL # BLD: 0.2 K/UL (ref 0–0.4)
EOSINOPHIL NFR BLD: 1 % (ref 0–7)
ERYTHROCYTE [DISTWIDTH] IN BLOOD BY AUTOMATED COUNT: 13.2 % (ref 11.5–14.5)
EST. AVERAGE GLUCOSE BLD GHB EST-MCNC: 123 MG/DL
GLOBULIN SER CALC-MCNC: 4.1 G/DL (ref 2–4)
GLUCOSE SERPL-MCNC: 103 MG/DL (ref 65–100)
HBA1C MFR BLD: 5.9 % (ref 4–5.6)
HCT VFR BLD AUTO: 42.9 % (ref 35–47)
HDLC SERPL-MCNC: 60 MG/DL
HDLC SERPL: 5.7 {RATIO} (ref 0–5)
HGB BLD-MCNC: 13.9 G/DL (ref 11.5–16)
IMM GRANULOCYTES # BLD AUTO: 0 K/UL (ref 0–0.04)
IMM GRANULOCYTES NFR BLD AUTO: 0 % (ref 0–0.5)
LDLC SERPL CALC-MCNC: 247.4 MG/DL (ref 0–100)
LIPID PROFILE,FLP: ABNORMAL
LYMPHOCYTES # BLD: 4.8 K/UL (ref 0.8–3.5)
LYMPHOCYTES NFR BLD: 41 % (ref 12–49)
MCH RBC QN AUTO: 29.2 PG (ref 26–34)
MCHC RBC AUTO-ENTMCNC: 32.4 G/DL (ref 30–36.5)
MCV RBC AUTO: 90.1 FL (ref 80–99)
MONOCYTES # BLD: 0.8 K/UL (ref 0–1)
MONOCYTES NFR BLD: 7 % (ref 5–13)
NEUTS SEG # BLD: 5.8 K/UL (ref 1.8–8)
NEUTS SEG NFR BLD: 50 % (ref 32–75)
NRBC # BLD: 0 K/UL (ref 0–0.01)
NRBC BLD-RTO: 0 PER 100 WBC
PERIPHERAL SMEAR,PSM: NORMAL
PLATELET # BLD AUTO: 269 K/UL (ref 150–400)
PMV BLD AUTO: 10.2 FL (ref 8.9–12.9)
POTASSIUM SERPL-SCNC: 4.4 MMOL/L (ref 3.5–5.1)
PROT SERPL-MCNC: 7.9 G/DL (ref 6.4–8.2)
RBC # BLD AUTO: 4.76 M/UL (ref 3.8–5.2)
SODIUM SERPL-SCNC: 141 MMOL/L (ref 136–145)
TRIGL SERPL-MCNC: 183 MG/DL (ref ?–150)
VLDLC SERPL CALC-MCNC: 36.6 MG/DL
WBC # BLD AUTO: 11.7 K/UL (ref 3.6–11)

## 2021-03-04 ENCOUNTER — TELEPHONE (OUTPATIENT)
Dept: INTERNAL MEDICINE CLINIC | Age: 61
End: 2021-03-04

## 2021-03-04 DIAGNOSIS — D72.829 LEUKOCYTOSIS, UNSPECIFIED TYPE: Primary | ICD-10-CM

## 2021-03-04 NOTE — TELEPHONE ENCOUNTER
matthew    ----- Message from Ansley Posey sent at 3/4/2021  1:03 PM EST -----  Regarding: Dr. Gianfranco Zaragoza first and last name: Pt  Reason for call: update records  Callback required yes/no and why: no. To update  Best contact number(s):[phone]  Details to clarify the request: Pt had a colonoscopy on 6/2/2016.

## 2021-03-04 NOTE — PROGRESS NOTES
Energy[employer] message sent. . Triglycerides 183. HDL 60. Creatinine 1.07 from 0.89. CMP otherwise normal.  A1c 5.9. WBC 11.7.  smear favoring reactive    Heme/onc referral for persistent leukocytosis.     Statin  Lifestyle for a1c

## 2021-05-25 ENCOUNTER — TELEPHONE (OUTPATIENT)
Dept: INFUSION THERAPY | Age: 61
End: 2021-05-25

## 2022-03-18 PROBLEM — R73.03 PREDIABETES: Status: ACTIVE | Noted: 2021-03-02

## 2022-03-18 PROBLEM — I10 ESSENTIAL HYPERTENSION: Status: ACTIVE | Noted: 2021-03-02

## 2022-03-19 PROBLEM — E66.01 SEVERE OBESITY (HCC): Status: ACTIVE | Noted: 2020-02-27

## 2022-03-19 PROBLEM — Z91.09 ENVIRONMENTAL ALLERGIES: Status: ACTIVE | Noted: 2020-02-27

## 2022-03-19 PROBLEM — E78.2 MIXED HYPERLIPIDEMIA: Status: ACTIVE | Noted: 2021-03-02

## 2022-03-19 PROBLEM — D72.829 LEUKOCYTOSIS, UNSPECIFIED TYPE: Status: ACTIVE | Noted: 2021-03-02

## 2022-08-23 ENCOUNTER — OFFICE VISIT (OUTPATIENT)
Dept: INTERNAL MEDICINE CLINIC | Age: 62
End: 2022-08-23
Payer: COMMERCIAL

## 2022-08-23 VITALS
OXYGEN SATURATION: 91 % | TEMPERATURE: 98 F | RESPIRATION RATE: 14 BRPM | WEIGHT: 224 LBS | SYSTOLIC BLOOD PRESSURE: 135 MMHG | HEIGHT: 64 IN | DIASTOLIC BLOOD PRESSURE: 86 MMHG | HEART RATE: 74 BPM | BODY MASS INDEX: 38.24 KG/M2

## 2022-08-23 DIAGNOSIS — D72.829 LEUKOCYTOSIS, UNSPECIFIED TYPE: ICD-10-CM

## 2022-08-23 DIAGNOSIS — Z00.00 WELL ADULT EXAM: Primary | ICD-10-CM

## 2022-08-23 DIAGNOSIS — E78.2 MIXED HYPERLIPIDEMIA: ICD-10-CM

## 2022-08-23 DIAGNOSIS — Z12.31 ENCOUNTER FOR SCREENING MAMMOGRAM FOR MALIGNANT NEOPLASM OF BREAST: ICD-10-CM

## 2022-08-23 DIAGNOSIS — K29.50 CHRONIC GASTRITIS WITHOUT BLEEDING, UNSPECIFIED GASTRITIS TYPE: ICD-10-CM

## 2022-08-23 DIAGNOSIS — R73.03 PREDIABETES: ICD-10-CM

## 2022-08-23 DIAGNOSIS — R09.89 TONSIL PAIN: ICD-10-CM

## 2022-08-23 DIAGNOSIS — M79.2 NEURALGIA: ICD-10-CM

## 2022-08-23 DIAGNOSIS — I10 ESSENTIAL HYPERTENSION: ICD-10-CM

## 2022-08-23 PROCEDURE — 99214 OFFICE O/P EST MOD 30 MIN: CPT | Performed by: INTERNAL MEDICINE

## 2022-08-23 PROCEDURE — 99396 PREV VISIT EST AGE 40-64: CPT | Performed by: INTERNAL MEDICINE

## 2022-08-23 RX ORDER — ACETAMINOPHEN 160 MG/5ML
SUSPENSION, ORAL (FINAL DOSE FORM) ORAL
COMMUNITY

## 2022-08-23 RX ORDER — OMEPRAZOLE 40 MG/1
40 CAPSULE, DELAYED RELEASE ORAL DAILY
Qty: 60 CAPSULE | Refills: 0 | Status: SHIPPED | OUTPATIENT
Start: 2022-08-23

## 2022-08-23 NOTE — ASSESSMENT & PLAN NOTE
Discussed shingles vaccine  Did have a colonoscopy in town -signed release for [employer] and gastrointestinal specialist to see if she had it there  Did get COVID+booster   Mammo ordered  History of abnormal Pap smear and was supposed to follow-up with OB/GYN but did not.   Advised patient to call her OB/GYN and follow-up  Encourage healthy habits

## 2022-08-23 NOTE — PROGRESS NOTES
Assessment and Plan     1. Well adult exam  Assessment & Plan:   Discussed shingles vaccine  Did have a colonoscopy in town -signed release for [facility] and gastrointestinal specialist to see if she had it there  Did get COVID+booster   Mammo ordered  History of abnormal Pap smear and was supposed to follow-up with OB/GYN but did not. Advised patient to call her OB/GYN and follow-up  Encourage healthy habits  2. Encounter for screening mammogram for malignant neoplasm of breast  -     [facility] 3D JUNIOR W MAMMO BI SCREENING INCL CAD; Future  3. Essential hypertension  Assessment & Plan:  Elevated on previous visits. Mildly elevated today but no meds needed. monitor  Orders:  -     METABOLIC PANEL, COMPREHENSIVE; Future  -     CBC WITH AUTOMATED DIFF; Future  4. Prediabetes  Assessment & Plan:   Noted previously, recheck  Orders:  -     HEMOGLOBIN A1C WITH EAG; Future  5. Mixed hyperlipidemia  Assessment & Plan:   Severely elevated, now open to medications  Check today but likely start rosuvastatin. Suspect possible familial hypercholesterolemia  Orders:  -     LIPID PANEL; Future  6. Neuralgia  Assessment & Plan: Throbbing sensation   Mostly right side temporal, occl radiation to back of head   Daily occurrence   Ongoing for at least 7-8 months  Doesn't feel like a headache - feels like something crawling   No vision changes, nausea, vomiting   Neuro exam normal.  Possible neuralgia. Not bothersome enough to her for medications. Recommend monitoring. Orders:  -     VITAMIN B12; Future  -     TSH 3RD GENERATION; Future  -     T4, FREE; Future  7. Chronic gastritis without bleeding, unspecified gastritis type  Assessment & Plan:  Pain in shoulder blade radiating to front  Only occurs after eating  Feels like has to reposition self  No reflux   Ongoing for maybe at least a 1-2 years   No dysphagia, odynophagia, fever, chills, night sweats  Given association with food, wonder if related to gastritis.   Recommend trial of omeprazole. Consider further work-up/imaging if no improvement  Orders:  -     omeprazole (PRILOSEC) 40 mg capsule; Take 1 Capsule by mouth daily. , Normal, Disp-60 Capsule, R-0  8. Tonsil pain  Assessment & Plan: Tonsils   Always had issues with it  Things get caught in tonsils   Throat feels irritated   She is interested in being evaluated for tonsillectomy  Referral to ENT provided per request  Orders:  -     REFERRAL TO ENT-OTOLARYNGOLOGY  9. Leukocytosis, unspecified type  Assessment & Plan:   Noted previously in the past, monitor     Benefits, risks, possible drug interactions, and side effects of all new medications were reviewed with the patient. Pt verbalized understanding. Return to clinic: 2 months for cholesterol, gastritis  IT   3 kids, , 2 grandkids  All kids live close to her    An electronic signature was used to authenticate this note. Terry Quiros MD  [facility]  8/23/2022    No future appointments. History of Present Illness   Chief Complaint   Physical    Deshawnnatacha Hsieh is a 58 y.o. female         Review of Systems   Constitutional:  Negative for chills and fever. HENT:  Negative for hearing loss. Eyes:  Negative for blurred vision. Respiratory:  Negative for shortness of breath. Cardiovascular:  Negative for chest pain. Gastrointestinal:  Negative for abdominal pain, blood in stool, constipation, diarrhea, melena, nausea and vomiting. Genitourinary:  Negative for dysuria and hematuria. Musculoskeletal:  Negative for joint pain. Skin:  Negative for rash. Neurological:  Negative for headaches. Past Medical History   No Known Allergies     Current Outpatient Medications   Medication Sig    coenzyme Q-10 (CO Q-10) 200 mg capsule     omeprazole (PRILOSEC) 40 mg capsule Take 1 Capsule by mouth daily.     fluocinoNIDE (LIDEX) 0.05 % external solution Apply  to affected area two (2) times daily as needed. omega 3-dha-epa-fish oil 100-160-1,000 mg cap Take  by mouth.    multivitamin (ONE A DAY) tablet Take 1 Tab by mouth daily. b complex vitamins tablet Take 1 Tab by mouth daily. loratadine (CLARITIN) 10 mg tablet Take 10 mg by mouth daily. No current facility-administered medications for this visit. Patient Active Problem List   Diagnosis Code    Migraines S53.379    Hemorrhoids K64.9    Environmental allergies Z91.09    Severe obesity ([address] E66.01    Prediabetes R73.03    Leukocytosis, unspecified type D72.829    Mixed hyperlipidemia E78.2    Essential hypertension I10    Well adult exam Z00.00    Neuralgia M79.2    Tonsil pain R09.89    Chronic gastritis without bleeding, unspecified gastritis type K29.50     Past Surgical History:   Procedure Laterality Date    HX  SECTION      x3    HX TUBAL LIGATION        Social History     Tobacco Use    Smoking status: Former    Smokeless tobacco: Never    Tobacco comments:     quit at age 32   Substance Use Topics    Alcohol use: Not Currently     Comment: wine 2x/month      Family History   Problem Relation Age of Onset    Stroke Mother     Elevated Lipids Mother     Heart Disease Father     Diabetes Father     Hypertension Father     Cancer Maternal Grandmother         breast cancer        Physical Exam   Vitals:       Visit Vitals  /86 (BP 1 Location: Left upper arm, BP Patient Position: Sitting, BP Cuff Size: Adult)   Pulse 74   Temp 98 °F (36.7 °C) (Oral)   Resp 14   Ht 5' 4\" (1.626 m)   Wt 224 lb (101.6 kg)   LMP 2011   SpO2 91%   BMI 38.45 kg/m²        Physical Exam  Constitutional:       General: She is not in acute distress. Appearance: She is well-developed. HENT:      Right Ear: Tympanic membrane, ear canal and external ear normal.      Left Ear: Tympanic membrane, ear canal and external ear normal.      Mouth/Throat:      Comments: Large tonsils noted with pitting.   Couple tonsil stones noted  Eyes: Extraocular Movements: Extraocular movements intact. Conjunctiva/sclera: Conjunctivae normal.   Cardiovascular:      Rate and Rhythm: Normal rate and regular rhythm. Pulses: Normal pulses. Heart sounds: No murmur heard. No friction rub. No gallop. Pulmonary:      Effort: No respiratory distress. Breath sounds: No wheezing, rhonchi or rales. Abdominal:      General: Bowel sounds are normal. There is no distension. Palpations: Abdomen is soft. There is no hepatomegaly, splenomegaly or mass. Tenderness: There is no abdominal tenderness. There is no guarding. Musculoskeletal:      Cervical back: Neck supple. Right lower leg: No edema. Left lower leg: No edema. Lymphadenopathy:      Cervical: No cervical adenopathy. Skin:     General: Skin is warm. Findings: No rash. Neurological:      Mental Status: She is alert and oriented to person, place, and time. Cranial Nerves: No cranial nerve deficit. Sensory: No sensory deficit. Motor: No weakness.       Coordination: Coordination normal.      Gait: Gait normal.

## 2022-08-23 NOTE — ASSESSMENT & PLAN NOTE
Throbbing sensation   Mostly right side temporal, occl radiation to back of head   Daily occurrence   Ongoing for at least 7-8 months  Doesn't feel like a headache - feels like something crawling   No vision changes, nausea, vomiting   Neuro exam normal.  Possible neuralgia. Not bothersome enough to her for medications. Recommend monitoring.

## 2022-08-23 NOTE — ASSESSMENT & PLAN NOTE
Severely elevated, now open to medications  Check today but likely start rosuvastatin.  Suspect possible familial hypercholesterolemia

## 2022-08-23 NOTE — ASSESSMENT & PLAN NOTE
Pain in shoulder blade radiating to front  Only occurs after eating  Feels like has to reposition self  No reflux   Ongoing for maybe at least a 1-2 years   No dysphagia, odynophagia, fever, chills, night sweats  Given association with food, wonder if related to gastritis. Recommend trial of omeprazole.   Consider further work-up/imaging if no improvement

## 2022-08-23 NOTE — ASSESSMENT & PLAN NOTE
Tonsils   Always had issues with it  Things get caught in tonsils   Throat feels irritated   She is interested in being evaluated for tonsillectomy  Referral to ENT provided per request

## 2022-08-26 ENCOUNTER — DOCUMENTATION ONLY (OUTPATIENT)
Dept: INTERNAL MEDICINE CLINIC | Age: 62
End: 2022-08-26

## 2022-08-31 ENCOUNTER — DOCUMENTATION ONLY (OUTPATIENT)
Dept: INTERNAL MEDICINE CLINIC | Age: 62
End: 2022-08-31

## 2022-09-22 PROBLEM — Z00.00 WELL ADULT EXAM: Status: RESOLVED | Noted: 2022-08-23 | Resolved: 2022-09-22

## 2023-02-24 ENCOUNTER — TELEPHONE (OUTPATIENT)
Dept: INTERNAL MEDICINE CLINIC | Age: 63
End: 2023-02-24

## 2023-02-24 NOTE — TELEPHONE ENCOUNTER
----- Message from Lara Bond sent at 2/23/2023  2:11 PM EST -----  Subject: Message to Provider    QUESTIONS  Information for Provider? Patient didn't realize she was supposed to do   labs from her August CPE. She would like to do them now, but needs to go   to [facility] because of better insurance coverage at THAT lab. Can you   please mail a paper order for labs to her PO Box on file. Or assure her   that [facility] can see the orders electronically. Or let her know how to   access the orders through [employer] to [facility].  ---------------------------------------------------------------------------  --------------  [address] [employer]  [phone]; OK to leave message on voicemail,OK to respond with electronic   message via [employer] portal (only for patients who have registered [employer]   account)  ---------------------------------------------------------------------------  --------------  SCRIPT ANSWERS  Relationship to Patient?  Self

## 2023-02-24 NOTE — TELEPHONE ENCOUNTER
Nurse returned patient call. Patient asked if lab order to be mailed to address on file. Nurse mailed off lab order to be completed at lab of patient choice.

## 2023-05-10 ENCOUNTER — OFFICE VISIT (OUTPATIENT)
Age: 63
End: 2023-05-10

## 2023-05-10 VITALS
DIASTOLIC BLOOD PRESSURE: 80 MMHG | HEIGHT: 64 IN | OXYGEN SATURATION: 94 % | HEART RATE: 84 BPM | BODY MASS INDEX: 39.64 KG/M2 | RESPIRATION RATE: 14 BRPM | SYSTOLIC BLOOD PRESSURE: 142 MMHG | WEIGHT: 232.2 LBS

## 2023-05-10 DIAGNOSIS — E78.2 MIXED HYPERLIPIDEMIA: ICD-10-CM

## 2023-05-10 DIAGNOSIS — K29.50 CHRONIC GASTRITIS WITHOUT BLEEDING, UNSPECIFIED GASTRITIS TYPE: ICD-10-CM

## 2023-05-10 DIAGNOSIS — R73.03 PREDIABETES: ICD-10-CM

## 2023-05-10 DIAGNOSIS — I10 ESSENTIAL HYPERTENSION: ICD-10-CM

## 2023-05-10 DIAGNOSIS — M79.89 LEG SWELLING: Primary | ICD-10-CM

## 2023-05-10 DIAGNOSIS — M79.89 LEG SWELLING: ICD-10-CM

## 2023-05-10 PROCEDURE — 3079F DIAST BP 80-89 MM HG: CPT | Performed by: INTERNAL MEDICINE

## 2023-05-10 PROCEDURE — 99214 OFFICE O/P EST MOD 30 MIN: CPT | Performed by: INTERNAL MEDICINE

## 2023-05-10 PROCEDURE — 3077F SYST BP >= 140 MM HG: CPT | Performed by: INTERNAL MEDICINE

## 2023-05-10 RX ORDER — HYDROCHLOROTHIAZIDE 12.5 MG/1
12.5 CAPSULE, GELATIN COATED ORAL EVERY MORNING
Qty: 30 CAPSULE | Refills: 1 | Status: SHIPPED | OUTPATIENT
Start: 2023-05-10

## 2023-05-10 RX ORDER — MULTIVIT WITH MINERALS/LUTEIN
1000 TABLET ORAL DAILY
COMMUNITY

## 2023-05-10 ASSESSMENT — PATIENT HEALTH QUESTIONNAIRE - PHQ9
1. LITTLE INTEREST OR PLEASURE IN DOING THINGS: 0
SUM OF ALL RESPONSES TO PHQ QUESTIONS 1-9: 0
2. FEELING DOWN, DEPRESSED OR HOPELESS: 0
SUM OF ALL RESPONSES TO PHQ QUESTIONS 1-9: 0
SUM OF ALL RESPONSES TO PHQ9 QUESTIONS 1 & 2: 0

## 2023-05-10 NOTE — PROGRESS NOTES
Note   Chief Complaint   Leg swelling    Simeon Mitchell is a 61 y.o. female     1. Leg swelling  Assessment & Plan:  Feet swelling off and on, sometimes worse with certain foods, salty foods, chronic issue for her  A couple days ago - developed swelling again and it was taking longer for swelling to go away   Mild SOB at time  No CP  No paroxysmal nocturnal dyspnea, no orthopnea   Swelling better today   Likely related to salt intake and chronic venous congestion, but will check LFTs, urine albumin, and BNP to r/o other causes. Leg elevated when sitting, increase walking breaks when sitting for a long time. Starting hctz for BP which may help with swelling  Orders:  -     Comprehensive Metabolic Panel; Future  -     Brain Natriuretic Peptide; Future  -     Microalbumin / Creatinine Urine Ratio; Future  2. Essential hypertension  Assessment & Plan:  Continues to be elevated  rec starting hctz 12.5mg daily  Orders:  -     CBC; Future  -     TSH; Future  -     T4, Free; Future  -     hydroCHLOROthiazide (MICROZIDE) 12.5 MG capsule; Take 1 capsule by mouth every morning, Disp-30 capsule, R-1Normal  -     Basic Metabolic Panel; Future  3. Prediabetes  -     Hemoglobin A1C; Future  4. Mixed hyperlipidemia  -     Lipid Panel; Future  5. Chronic gastritis without bleeding, unspecified gastritis type  Assessment & Plan:   Last visit:  Pain in shoulder blade radiating to front  Only occurs after eating  Feels like has to reposition self  No reflux   Ongoing for maybe at least a 1-2 years   No dysphagia, odynophagia, fever, chills, night sweats  Given association with food, wonder if related to gastritis. Recommend trial of omeprazole. Consider further work-up/imaging if no improvement  ====  Never used omeprazole  Does still have symptoms, but doesn't bother her enough to want to take a medication        Benefits, risks, possible drug interactions, and side effects of all new medications were reviewed with the patient.

## 2023-05-10 NOTE — ASSESSMENT & PLAN NOTE
Last visit:  Pain in shoulder blade radiating to front  Only occurs after eating  Feels like has to reposition self  No reflux   Ongoing for maybe at least a 1-2 years   No dysphagia, odynophagia, fever, chills, night sweats  Given association with food, wonder if related to gastritis. Recommend trial of omeprazole.   Consider further work-up/imaging if no improvement  ====  Never used omeprazole  Does still have symptoms, but doesn't bother her enough to want to take a medication

## 2023-05-10 NOTE — ASSESSMENT & PLAN NOTE
Feet swelling off and on, sometimes worse with certain foods, salty foods, chronic issue for her  A couple days ago - developed swelling again and it was taking longer for swelling to go away   Mild SOB at time  No CP  No paroxysmal nocturnal dyspnea, no orthopnea   Swelling better today   Likely related to salt intake and chronic venous congestion, but will check LFTs, urine albumin, and BNP to r/o other causes. Leg elevated when sitting, increase walking breaks when sitting for a long time.   Starting hctz for BP which may help with swelling

## 2023-05-11 LAB
CREAT UR-MCNC: 118 MG/DL
MICROALBUMIN UR-MCNC: 1.31 MG/DL
MICROALBUMIN/CREAT UR-RTO: 11 MG/G (ref 0–30)
T4 FREE SERPL-MCNC: 0.9 NG/DL (ref 0.8–1.5)

## 2023-06-15 LAB
ALBUMIN SERPL-MCNC: 3.9 G/DL (ref 3.5–5)
ALBUMIN/GLOB SERPL: 1 (ref 1.1–2.2)
ALP SERPL-CCNC: 89 U/L (ref 45–117)
ALT SERPL-CCNC: 24 U/L (ref 12–78)
ANION GAP SERPL CALC-SCNC: 4 MMOL/L (ref 5–15)
AST SERPL-CCNC: 17 U/L (ref 15–37)
BILIRUB SERPL-MCNC: 0.4 MG/DL (ref 0.2–1)
BUN SERPL-MCNC: 10 MG/DL (ref 6–20)
BUN/CREAT SERPL: 10 (ref 12–20)
CALCIUM SERPL-MCNC: 9.8 MG/DL (ref 8.5–10.1)
CHLORIDE SERPL-SCNC: 107 MMOL/L (ref 97–108)
CHOLEST SERPL-MCNC: 311 MG/DL
CO2 SERPL-SCNC: 28 MMOL/L (ref 21–32)
CREAT SERPL-MCNC: 1.05 MG/DL (ref 0.55–1.02)
ERYTHROCYTE [DISTWIDTH] IN BLOOD BY AUTOMATED COUNT: 13.2 % (ref 11.5–14.5)
EST. AVERAGE GLUCOSE BLD GHB EST-MCNC: 123 MG/DL
GLOBULIN SER CALC-MCNC: 3.9 G/DL (ref 2–4)
GLUCOSE SERPL-MCNC: 104 MG/DL (ref 65–100)
HBA1C MFR BLD: 5.9 % (ref 4–5.6)
HCT VFR BLD AUTO: 44.8 % (ref 35–47)
HDLC SERPL-MCNC: 51 MG/DL
HDLC SERPL: 6.1 (ref 0–5)
HGB BLD-MCNC: 14.3 G/DL (ref 11.5–16)
LDLC SERPL CALC-MCNC: 208 MG/DL (ref 0–100)
MCH RBC QN AUTO: 29.4 PG (ref 26–34)
MCHC RBC AUTO-ENTMCNC: 31.9 G/DL (ref 30–36.5)
MCV RBC AUTO: 92 FL (ref 80–99)
NRBC # BLD: 0 K/UL (ref 0–0.01)
NRBC BLD-RTO: 0 PER 100 WBC
NT PRO BNP: 38 PG/ML
PLATELET # BLD AUTO: 290 K/UL (ref 150–400)
PMV BLD AUTO: 10.3 FL (ref 8.9–12.9)
POTASSIUM SERPL-SCNC: 4.1 MMOL/L (ref 3.5–5.1)
PROT SERPL-MCNC: 7.8 G/DL (ref 6.4–8.2)
RBC # BLD AUTO: 4.87 M/UL (ref 3.8–5.2)
SODIUM SERPL-SCNC: 139 MMOL/L (ref 136–145)
TRIGL SERPL-MCNC: 260 MG/DL
TSH SERPL DL<=0.05 MIU/L-ACNC: 1.76 UIU/ML (ref 0.36–3.74)
VLDLC SERPL CALC-MCNC: 52 MG/DL
WBC # BLD AUTO: 11.1 K/UL (ref 3.6–11)

## 2023-06-20 ENCOUNTER — OFFICE VISIT (OUTPATIENT)
Age: 63
End: 2023-06-20
Payer: COMMERCIAL

## 2023-06-20 VITALS
HEIGHT: 64 IN | HEART RATE: 73 BPM | DIASTOLIC BLOOD PRESSURE: 88 MMHG | WEIGHT: 226.2 LBS | OXYGEN SATURATION: 92 % | SYSTOLIC BLOOD PRESSURE: 139 MMHG | BODY MASS INDEX: 38.62 KG/M2 | RESPIRATION RATE: 14 BRPM

## 2023-06-20 DIAGNOSIS — I10 ESSENTIAL HYPERTENSION: ICD-10-CM

## 2023-06-20 DIAGNOSIS — Z12.31 ENCOUNTER FOR SCREENING MAMMOGRAM FOR MALIGNANT NEOPLASM OF BREAST: ICD-10-CM

## 2023-06-20 DIAGNOSIS — E78.2 MIXED HYPERLIPIDEMIA: ICD-10-CM

## 2023-06-20 DIAGNOSIS — Z00.00 WELL ADULT EXAM: Primary | ICD-10-CM

## 2023-06-20 LAB
ANION GAP SERPL CALC-SCNC: 6 MMOL/L (ref 5–15)
BUN SERPL-MCNC: 14 MG/DL (ref 6–20)
BUN/CREAT SERPL: 15 (ref 12–20)
CALCIUM SERPL-MCNC: 9.5 MG/DL (ref 8.5–10.1)
CHLORIDE SERPL-SCNC: 108 MMOL/L (ref 97–108)
CO2 SERPL-SCNC: 28 MMOL/L (ref 21–32)
CREAT SERPL-MCNC: 0.96 MG/DL (ref 0.55–1.02)
GLUCOSE SERPL-MCNC: 92 MG/DL (ref 65–100)
POTASSIUM SERPL-SCNC: 4.1 MMOL/L (ref 3.5–5.1)
SODIUM SERPL-SCNC: 142 MMOL/L (ref 136–145)

## 2023-06-20 PROCEDURE — 99214 OFFICE O/P EST MOD 30 MIN: CPT | Performed by: INTERNAL MEDICINE

## 2023-06-20 PROCEDURE — 3079F DIAST BP 80-89 MM HG: CPT | Performed by: INTERNAL MEDICINE

## 2023-06-20 PROCEDURE — 99396 PREV VISIT EST AGE 40-64: CPT | Performed by: INTERNAL MEDICINE

## 2023-06-20 PROCEDURE — 3075F SYST BP GE 130 - 139MM HG: CPT | Performed by: INTERNAL MEDICINE

## 2023-06-20 RX ORDER — ROSUVASTATIN CALCIUM 20 MG/1
20 TABLET, COATED ORAL DAILY
Qty: 90 TABLET | Refills: 1 | Status: SHIPPED | OUTPATIENT
Start: 2023-06-20

## 2023-06-20 RX ORDER — HYDROCHLOROTHIAZIDE 25 MG/1
25 TABLET ORAL EVERY MORNING
Qty: 90 TABLET | Refills: 3 | Status: SHIPPED | OUTPATIENT
Start: 2023-06-20

## 2023-06-20 RX ORDER — CHLORAL HYDRATE 500 MG
CAPSULE ORAL DAILY
COMMUNITY

## 2023-06-20 SDOH — ECONOMIC STABILITY: FOOD INSECURITY: WITHIN THE PAST 12 MONTHS, YOU WORRIED THAT YOUR FOOD WOULD RUN OUT BEFORE YOU GOT MONEY TO BUY MORE.: NEVER TRUE

## 2023-06-20 SDOH — ECONOMIC STABILITY: INCOME INSECURITY: HOW HARD IS IT FOR YOU TO PAY FOR THE VERY BASICS LIKE FOOD, HOUSING, MEDICAL CARE, AND HEATING?: NOT HARD AT ALL

## 2023-06-20 SDOH — ECONOMIC STABILITY: FOOD INSECURITY: WITHIN THE PAST 12 MONTHS, THE FOOD YOU BOUGHT JUST DIDN'T LAST AND YOU DIDN'T HAVE MONEY TO GET MORE.: NEVER TRUE

## 2023-06-20 SDOH — ECONOMIC STABILITY: HOUSING INSECURITY
IN THE LAST 12 MONTHS, WAS THERE A TIME WHEN YOU DID NOT HAVE A STEADY PLACE TO SLEEP OR SLEPT IN A SHELTER (INCLUDING NOW)?: NO

## 2023-06-20 ASSESSMENT — PATIENT HEALTH QUESTIONNAIRE - PHQ9
2. FEELING DOWN, DEPRESSED OR HOPELESS: 0
SUM OF ALL RESPONSES TO PHQ QUESTIONS 1-9: 0
1. LITTLE INTEREST OR PLEASURE IN DOING THINGS: 0
SUM OF ALL RESPONSES TO PHQ QUESTIONS 1-9: 0
SUM OF ALL RESPONSES TO PHQ9 QUESTIONS 1 & 2: 0

## 2023-06-20 ASSESSMENT — ENCOUNTER SYMPTOMS
CONSTIPATION: 0
ABDOMINAL PAIN: 0
SHORTNESS OF BREATH: 0
DIARRHEA: 0
VOMITING: 0
NAUSEA: 0

## 2023-06-20 NOTE — ASSESSMENT & PLAN NOTE
Mammo ordered  Rec following up with OBGYN for pap  Had a colonoscopy ~2018  Declines TDAP, shingles   Encourage healthy habits

## 2023-06-20 NOTE — ASSESSMENT & PLAN NOTE
Started hctz 12.5 last visit for BP and LE edema  Leg swelling - Really dependent on what she's eaten   No SE   Inc hctz to 25, check bmp

## 2023-06-20 NOTE — PROGRESS NOTES
Assessment and Plan     1. Well adult exam  Assessment & Plan:  Mammo ordered  Rec following up with OBGYN for pap  Had a colonoscopy ~2018  Declines TDAP, shingles   Encourage healthy habits  2. Essential hypertension  Assessment & Plan:  Started hctz 12.5 last visit for BP and LE edema  Leg swelling - Really dependent on what she's eaten   No SE   Inc hctz to 25, check bmp  Orders:  -     Basic Metabolic Panel; Future  -     hydroCHLOROthiazide (HYDRODIURIL) 25 MG tablet; Take 1 tablet by mouth every morning, Disp-90 tablet, R-3Normal  3. Mixed hyperlipidemia  Assessment & Plan:    - start rosuvastatin 20mg daily. Can add coq10 to minimize muscle aches  Orders:  -     rosuvastatin (CRESTOR) 20 MG tablet; Take 1 tablet by mouth daily, Disp-90 tablet, R-1Normal  4. Encounter for screening mammogram for malignant neoplasm of breast  -     ROSALINDA HAILEE DIGITAL SCREEN BILATERAL; Future       Benefits, risks, possible drug interactions, and side effects of all new medications were reviewed with the patient. Pt verbalized understanding. Return to clinic:  2mo with Camille Moreno for BP/Chol check  IT   3 kids, , 2 grandkids  All kids live close to her    An electronic signature was used to authenticate this note. Elton Jeffrey MD  [facility]  6/20/2023    No future appointments. History of Present Illness   Chief Complaint   physical    Karissa Labs is a 61 y.o. female         Review of Systems   Constitutional:  Negative for chills, fever and unexpected weight change. Respiratory:  Negative for shortness of breath. Cardiovascular:  Negative for chest pain. Gastrointestinal:  Negative for abdominal pain, constipation, diarrhea, nausea and vomiting. Genitourinary:  Negative for dysuria and hematuria. Musculoskeletal:  Negative for arthralgias. Skin:  Negative for rash. Neurological:  Negative for headaches.       Past Medical History   No

## 2023-06-20 NOTE — PATIENT INSTRUCTIONS
VPFW:  [facility]  [facility] [address]  [place], [address] [facility]   [phone]

## 2023-07-20 PROBLEM — Z00.00 WELL ADULT EXAM: Status: RESOLVED | Noted: 2022-08-23 | Resolved: 2023-07-20

## 2024-01-08 ENCOUNTER — OFFICE VISIT (OUTPATIENT)
Age: 64
End: 2024-01-08
Payer: COMMERCIAL

## 2024-01-08 VITALS
OXYGEN SATURATION: 95 % | HEIGHT: 64 IN | TEMPERATURE: 97.7 F | RESPIRATION RATE: 14 BRPM | SYSTOLIC BLOOD PRESSURE: 128 MMHG | HEART RATE: 73 BPM | WEIGHT: 236.2 LBS | BODY MASS INDEX: 40.33 KG/M2 | DIASTOLIC BLOOD PRESSURE: 85 MMHG

## 2024-01-08 DIAGNOSIS — E78.2 MIXED HYPERLIPIDEMIA: ICD-10-CM

## 2024-01-08 DIAGNOSIS — I10 ESSENTIAL HYPERTENSION: Primary | ICD-10-CM

## 2024-01-08 DIAGNOSIS — I10 ESSENTIAL HYPERTENSION: ICD-10-CM

## 2024-01-08 DIAGNOSIS — Z00.00 ROUTINE HISTORY AND PHYSICAL EXAMINATION OF ADULT: ICD-10-CM

## 2024-01-08 PROBLEM — M79.89 LEG SWELLING: Status: RESOLVED | Noted: 2023-05-10 | Resolved: 2024-01-08

## 2024-01-08 PROBLEM — R09.89 TONSIL PAIN: Status: RESOLVED | Noted: 2022-08-23 | Resolved: 2024-01-08

## 2024-01-08 PROCEDURE — 99214 OFFICE O/P EST MOD 30 MIN: CPT | Performed by: INTERNAL MEDICINE

## 2024-01-08 PROCEDURE — 3074F SYST BP LT 130 MM HG: CPT | Performed by: INTERNAL MEDICINE

## 2024-01-08 PROCEDURE — 3079F DIAST BP 80-89 MM HG: CPT | Performed by: INTERNAL MEDICINE

## 2024-01-08 RX ORDER — ROSUVASTATIN CALCIUM 20 MG/1
20 TABLET, COATED ORAL DAILY
Qty: 90 TABLET | Refills: 4 | Status: SHIPPED | OUTPATIENT
Start: 2024-01-08

## 2024-01-08 RX ORDER — CEPHRADINE 500 MG
CAPSULE ORAL
COMMUNITY

## 2024-01-08 ASSESSMENT — PATIENT HEALTH QUESTIONNAIRE - PHQ9
1. LITTLE INTEREST OR PLEASURE IN DOING THINGS: 0
SUM OF ALL RESPONSES TO PHQ QUESTIONS 1-9: 0
SUM OF ALL RESPONSES TO PHQ QUESTIONS 1-9: 0
2. FEELING DOWN, DEPRESSED OR HOPELESS: 0
SUM OF ALL RESPONSES TO PHQ QUESTIONS 1-9: 0
SUM OF ALL RESPONSES TO PHQ QUESTIONS 1-9: 0
SUM OF ALL RESPONSES TO PHQ9 QUESTIONS 1 & 2: 0

## 2024-01-08 NOTE — PROGRESS NOTES
pitting peripheral edema  Lungs: good air movement, clear to auscultation bilaterally, no wheezing or rales noted

## 2024-01-09 LAB
ALBUMIN SERPL-MCNC: 4.2 G/DL (ref 3.5–5)
ALBUMIN/GLOB SERPL: 1.1 (ref 1.1–2.2)
ALP SERPL-CCNC: 60 U/L (ref 45–117)
ALT SERPL-CCNC: 24 U/L (ref 12–78)
ANION GAP SERPL CALC-SCNC: 4 MMOL/L (ref 5–15)
AST SERPL-CCNC: 16 U/L (ref 15–37)
BILIRUB SERPL-MCNC: 0.5 MG/DL (ref 0.2–1)
BUN SERPL-MCNC: 16 MG/DL (ref 6–20)
BUN/CREAT SERPL: 16 (ref 12–20)
CALCIUM SERPL-MCNC: 9.3 MG/DL (ref 8.5–10.1)
CHLORIDE SERPL-SCNC: 106 MMOL/L (ref 97–108)
CHOLEST SERPL-MCNC: 198 MG/DL
CO2 SERPL-SCNC: 30 MMOL/L (ref 21–32)
CREAT SERPL-MCNC: 1.03 MG/DL (ref 0.55–1.02)
GLOBULIN SER CALC-MCNC: 3.8 G/DL (ref 2–4)
GLUCOSE SERPL-MCNC: 86 MG/DL (ref 65–100)
HDLC SERPL-MCNC: 63 MG/DL
HDLC SERPL: 3.1 (ref 0–5)
LDLC SERPL CALC-MCNC: 107.4 MG/DL (ref 0–100)
POTASSIUM SERPL-SCNC: 4 MMOL/L (ref 3.5–5.1)
PROT SERPL-MCNC: 8 G/DL (ref 6.4–8.2)
SODIUM SERPL-SCNC: 140 MMOL/L (ref 136–145)
TRIGL SERPL-MCNC: 138 MG/DL
VLDLC SERPL CALC-MCNC: 27.6 MG/DL

## 2024-06-25 DIAGNOSIS — I10 ESSENTIAL HYPERTENSION: ICD-10-CM

## 2024-06-25 RX ORDER — HYDROCHLOROTHIAZIDE 25 MG/1
25 TABLET ORAL EVERY MORNING
Qty: 90 TABLET | Refills: 4 | Status: SHIPPED | OUTPATIENT
Start: 2024-06-25

## 2024-07-03 DIAGNOSIS — Z00.00 ROUTINE HISTORY AND PHYSICAL EXAMINATION OF ADULT: ICD-10-CM

## 2024-07-03 LAB
ALBUMIN SERPL-MCNC: 3.8 G/DL (ref 3.5–5)
ALBUMIN/GLOB SERPL: 1.1 (ref 1.1–2.2)
ALP SERPL-CCNC: 55 U/L (ref 45–117)
ALT SERPL-CCNC: 34 U/L (ref 12–78)
ANION GAP SERPL CALC-SCNC: 7 MMOL/L (ref 5–15)
AST SERPL-CCNC: 29 U/L (ref 15–37)
BASOPHILS # BLD: 0.1 K/UL (ref 0–0.1)
BASOPHILS NFR BLD: 0 % (ref 0–1)
BILIRUB SERPL-MCNC: 0.5 MG/DL (ref 0.2–1)
BUN SERPL-MCNC: 13 MG/DL (ref 6–20)
BUN/CREAT SERPL: 14 (ref 12–20)
CALCIUM SERPL-MCNC: 9.1 MG/DL (ref 8.5–10.1)
CHLORIDE SERPL-SCNC: 106 MMOL/L (ref 97–108)
CHOLEST SERPL-MCNC: 180 MG/DL
CO2 SERPL-SCNC: 29 MMOL/L (ref 21–32)
CREAT SERPL-MCNC: 0.92 MG/DL (ref 0.55–1.02)
DIFFERENTIAL METHOD BLD: ABNORMAL
EOSINOPHIL # BLD: 0.2 K/UL (ref 0–0.4)
EOSINOPHIL NFR BLD: 2 % (ref 0–7)
ERYTHROCYTE [DISTWIDTH] IN BLOOD BY AUTOMATED COUNT: 13.5 % (ref 11.5–14.5)
GLOBULIN SER CALC-MCNC: 3.5 G/DL (ref 2–4)
GLUCOSE SERPL-MCNC: 98 MG/DL (ref 65–100)
HCT VFR BLD AUTO: 42 % (ref 35–47)
HDLC SERPL-MCNC: 56 MG/DL
HDLC SERPL: 3.2 (ref 0–5)
HGB BLD-MCNC: 13.4 G/DL (ref 11.5–16)
IMM GRANULOCYTES # BLD AUTO: 0 K/UL (ref 0–0.04)
IMM GRANULOCYTES NFR BLD AUTO: 0 % (ref 0–0.5)
LDLC SERPL CALC-MCNC: 93.2 MG/DL (ref 0–100)
LYMPHOCYTES # BLD: 5.1 K/UL (ref 0.8–3.5)
LYMPHOCYTES NFR BLD: 45 % (ref 12–49)
MCH RBC QN AUTO: 29.5 PG (ref 26–34)
MCHC RBC AUTO-ENTMCNC: 31.9 G/DL (ref 30–36.5)
MCV RBC AUTO: 92.5 FL (ref 80–99)
MONOCYTES # BLD: 0.9 K/UL (ref 0–1)
MONOCYTES NFR BLD: 7 % (ref 5–13)
NEUTS SEG # BLD: 5.2 K/UL (ref 1.8–8)
NEUTS SEG NFR BLD: 46 % (ref 32–75)
NRBC # BLD: 0 K/UL (ref 0–0.01)
NRBC BLD-RTO: 0 PER 100 WBC
PLATELET # BLD AUTO: 241 K/UL (ref 150–400)
PMV BLD AUTO: 10.7 FL (ref 8.9–12.9)
POTASSIUM SERPL-SCNC: 3.9 MMOL/L (ref 3.5–5.1)
PROT SERPL-MCNC: 7.3 G/DL (ref 6.4–8.2)
RBC # BLD AUTO: 4.54 M/UL (ref 3.8–5.2)
SODIUM SERPL-SCNC: 142 MMOL/L (ref 136–145)
TRIGL SERPL-MCNC: 154 MG/DL
VLDLC SERPL CALC-MCNC: 30.8 MG/DL
WBC # BLD AUTO: 11.5 K/UL (ref 3.6–11)

## 2024-07-08 ENCOUNTER — OFFICE VISIT (OUTPATIENT)
Age: 64
End: 2024-07-08
Payer: COMMERCIAL

## 2024-07-08 VITALS
DIASTOLIC BLOOD PRESSURE: 87 MMHG | BODY MASS INDEX: 39.99 KG/M2 | TEMPERATURE: 98.2 F | OXYGEN SATURATION: 99 % | SYSTOLIC BLOOD PRESSURE: 134 MMHG | WEIGHT: 233 LBS | HEART RATE: 77 BPM

## 2024-07-08 DIAGNOSIS — Z12.31 ENCOUNTER FOR SCREENING MAMMOGRAM FOR BREAST CANCER: ICD-10-CM

## 2024-07-08 DIAGNOSIS — Z00.00 ROUTINE HISTORY AND PHYSICAL EXAMINATION OF ADULT: Primary | ICD-10-CM

## 2024-07-08 PROCEDURE — 3075F SYST BP GE 130 - 139MM HG: CPT | Performed by: INTERNAL MEDICINE

## 2024-07-08 PROCEDURE — 3079F DIAST BP 80-89 MM HG: CPT | Performed by: INTERNAL MEDICINE

## 2024-07-08 PROCEDURE — 99396 PREV VISIT EST AGE 40-64: CPT | Performed by: INTERNAL MEDICINE

## 2024-07-08 RX ORDER — LANOLIN ALCOHOL/MO/W.PET/CERES
5 CREAM (GRAM) TOPICAL DAILY
COMMUNITY

## 2024-07-08 RX ORDER — VITAMIN E 268 MG
400 CAPSULE ORAL 2 TIMES DAILY
COMMUNITY

## 2024-07-08 SDOH — ECONOMIC STABILITY: FOOD INSECURITY: WITHIN THE PAST 12 MONTHS, YOU WORRIED THAT YOUR FOOD WOULD RUN OUT BEFORE YOU GOT MONEY TO BUY MORE.: NEVER TRUE

## 2024-07-08 SDOH — ECONOMIC STABILITY: INCOME INSECURITY: HOW HARD IS IT FOR YOU TO PAY FOR THE VERY BASICS LIKE FOOD, HOUSING, MEDICAL CARE, AND HEATING?: NOT HARD AT ALL

## 2024-07-08 SDOH — ECONOMIC STABILITY: FOOD INSECURITY: WITHIN THE PAST 12 MONTHS, THE FOOD YOU BOUGHT JUST DIDN'T LAST AND YOU DIDN'T HAVE MONEY TO GET MORE.: NEVER TRUE

## 2024-07-08 NOTE — PATIENT INSTRUCTIONS
The ideal exercise is 150 minutes per week (30 minutes for 5 days or 22 minutes every day) of moderate intensity aerobic exercise. Moderate intensity is walking briskly, riding a bike, pushing a , playing doubles tennis. Two days per week of strengthening for major muscles is also recommended.     Please have your updated Shingles vaccine, at the pharmacy.

## 2024-07-08 NOTE — PROGRESS NOTES
Merissa Johnson (:  1960) is a 64 y.o. female,Established patient, here for evaluation of the following chief complaint(s):  Annual Exam      Assessment & Plan       1. Routine history and physical examination of adult  Most immunizations are up to date--TDaP administered today. Colonoscopy will be due in . Mammogram is scheduled. Pap smear is up to date. Moderate intensity exercise of at least 150 minutes per week recommended.     2. Encounter for screening mammogram for breast cancer  To be done soon, per gynecology.         Follow up: 6 months for HTN, pre-DM      Subjective   HPI    Merissa Johnson is here for a wellness visit.     Health maintenance: She has been doing well, has few complaints today. She is exercising less, as she is working from home now. 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 Her last pap smear was done a couple weeks ago with gynecology--had post menopausal bleeding.  Mammogram is scheduled. She has had a colonoscopy for colon cancer screening in 2018.      Patient Active Problem List   Diagnosis    Hemorrhoids    Prediabetes    Essential hypertension    Migraines    Environmental allergies    Leukocytosis, unspecified type    Severe obesity (HCC)    Mixed hyperlipidemia    Neuralgia    Chronic gastritis without bleeding, unspecified gastritis type        Past Medical History:   Diagnosis Date    Environmental allergies     Hemorrhoids     Banded    Obesity           Family History   Problem Relation Age of Onset    Stroke Mother     Elevated Lipids Mother     Heart Disease Father         heart attack    Diabetes Father     Hypertension Father     Cancer Maternal Grandmother         breast cancer        Past Surgical History:   Procedure Laterality Date     SECTION      x3    TUBAL LIGATION            Social History     Tobacco Use    Smoking status: Former    Smokeless tobacco: Never    Tobacco comments:     Quit smoking: quit at age 26   Substance Use

## 2024-07-08 NOTE — PROGRESS NOTES
Identified pt with two pt identifiers(name and ). Reviewed record in preparation for visit and have obtained necessary documentation.  Chief Complaint   Patient presents with    Annual Exam      Vitals:    24 1401 24 1406   BP: (!) 143/89 134/87   Pulse: 77    Temp: 98.2 °F (36.8 °C)    TempSrc: Oral    SpO2: 99%    Weight: 105.7 kg (233 lb)       Pain Scale: 0 - No pain/10  Health Maintenance Due   Topic    HIV screen     DTaP/Tdap/Td vaccine (1 - Tdap)    Cervical cancer screen     Breast cancer screen     Colorectal Cancer Screen     Shingles vaccine (1 of 2)    Respiratory Syncytial Virus (RSV) Pregnant or age 60 yrs+ (1 - 1-dose 60+ series)    A1C test (Diabetic or Prediabetic)      Coordination of Care Questionnaire:  :   1. Have you been to the ER, urgent care clinic since your last visit?  Hospitalized since your last visit?no  2. Have you seen or consulted any other health care providers outside of the [facility] since your last visit?  Include any pap smears or colon screening. no

## 2024-07-08 NOTE — PROGRESS NOTES
Patient declined Tdap vaccine upon administration, and stated she will reconsider next time.     TRAVON, RN

## 2024-08-23 ENCOUNTER — OFFICE VISIT (OUTPATIENT)
Age: 64
End: 2024-08-23
Payer: COMMERCIAL

## 2024-08-23 VITALS
HEART RATE: 80 BPM | DIASTOLIC BLOOD PRESSURE: 73 MMHG | BODY MASS INDEX: 40.02 KG/M2 | HEIGHT: 64 IN | OXYGEN SATURATION: 95 % | SYSTOLIC BLOOD PRESSURE: 131 MMHG | WEIGHT: 234.4 LBS | RESPIRATION RATE: 14 BRPM

## 2024-08-23 DIAGNOSIS — Z01.818 PREOP EXAMINATION: ICD-10-CM

## 2024-08-23 DIAGNOSIS — I10 ESSENTIAL HYPERTENSION: ICD-10-CM

## 2024-08-23 DIAGNOSIS — N95.0 POST-MENOPAUSAL BLEEDING: Primary | ICD-10-CM

## 2024-08-23 LAB
ANION GAP SERPL CALC-SCNC: 2 MMOL/L (ref 5–15)
BASOPHILS # BLD: 0 K/UL (ref 0–0.1)
BASOPHILS NFR BLD: 0 % (ref 0–1)
BUN SERPL-MCNC: 13 MG/DL (ref 6–20)
BUN/CREAT SERPL: 12 (ref 12–20)
CALCIUM SERPL-MCNC: 10.3 MG/DL (ref 8.5–10.1)
CHLORIDE SERPL-SCNC: 106 MMOL/L (ref 97–108)
CO2 SERPL-SCNC: 33 MMOL/L (ref 21–32)
CREAT SERPL-MCNC: 1.06 MG/DL (ref 0.55–1.02)
DIFFERENTIAL METHOD BLD: ABNORMAL
EOSINOPHIL # BLD: 0.3 K/UL (ref 0–0.4)
EOSINOPHIL NFR BLD: 2 % (ref 0–7)
ERYTHROCYTE [DISTWIDTH] IN BLOOD BY AUTOMATED COUNT: 13.1 % (ref 11.5–14.5)
GLUCOSE SERPL-MCNC: 72 MG/DL (ref 65–100)
HCT VFR BLD AUTO: 43.4 % (ref 35–47)
HGB BLD-MCNC: 14.2 G/DL (ref 11.5–16)
IMM GRANULOCYTES # BLD AUTO: 0 K/UL (ref 0–0.04)
IMM GRANULOCYTES NFR BLD AUTO: 0 % (ref 0–0.5)
LYMPHOCYTES # BLD: 6.6 K/UL (ref 0.8–3.5)
LYMPHOCYTES NFR BLD: 49 % (ref 12–49)
MCH RBC QN AUTO: 29.6 PG (ref 26–34)
MCHC RBC AUTO-ENTMCNC: 32.7 G/DL (ref 30–36.5)
MCV RBC AUTO: 90.4 FL (ref 80–99)
MONOCYTES # BLD: 1.2 K/UL (ref 0–1)
MONOCYTES NFR BLD: 9 % (ref 5–13)
NEUTS SEG # BLD: 5.4 K/UL (ref 1.8–8)
NEUTS SEG NFR BLD: 40 % (ref 32–75)
NRBC # BLD: 0 K/UL (ref 0–0.01)
NRBC BLD-RTO: 0 PER 100 WBC
PLATELET # BLD AUTO: 261 K/UL (ref 150–400)
PMV BLD AUTO: 10.3 FL (ref 8.9–12.9)
POTASSIUM SERPL-SCNC: 4 MMOL/L (ref 3.5–5.1)
RBC # BLD AUTO: 4.8 M/UL (ref 3.8–5.2)
RBC MORPH BLD: ABNORMAL
SODIUM SERPL-SCNC: 141 MMOL/L (ref 136–145)
WBC # BLD AUTO: 13.5 K/UL (ref 3.6–11)
WBC MORPH BLD: ABNORMAL

## 2024-08-23 PROCEDURE — 99214 OFFICE O/P EST MOD 30 MIN: CPT | Performed by: INTERNAL MEDICINE

## 2024-08-23 PROCEDURE — 3075F SYST BP GE 130 - 139MM HG: CPT | Performed by: INTERNAL MEDICINE

## 2024-08-23 PROCEDURE — 93000 ELECTROCARDIOGRAM COMPLETE: CPT | Performed by: INTERNAL MEDICINE

## 2024-08-23 PROCEDURE — 3078F DIAST BP <80 MM HG: CPT | Performed by: INTERNAL MEDICINE

## 2024-08-23 ASSESSMENT — PATIENT HEALTH QUESTIONNAIRE - PHQ9
SUM OF ALL RESPONSES TO PHQ QUESTIONS 1-9: 0
SUM OF ALL RESPONSES TO PHQ9 QUESTIONS 1 & 2: 0
2. FEELING DOWN, DEPRESSED OR HOPELESS: NOT AT ALL
1. LITTLE INTEREST OR PLEASURE IN DOING THINGS: NOT AT ALL

## 2024-08-23 NOTE — PROGRESS NOTES
Pre-op exam    A/P:    1. Post-menopausal bleeding  With planned D&C with gynecology, Dr. Gao.     2. Preop examination  Risk for perioperative cardiovascular events is low--0.4% by Revised Cardiac Risk Index. STOP-BANG score is 3--for age, BMI and HTN only. Risk for sleep apnea is low, as she does not have symptoms.  - EKG 12 Lead  - Basic Metabolic Panel; Future  - CBC with Auto Differential; Future    3. Essential hypertension  Controlled on hydrochlorothiazide. Continue current regimen.   - Basic Metabolic Panel; Future  - CBC with Auto Differential; Future           Queta Randle MD            HPI: Merissa Johnson is here for a pre-op visit:     Post-menopausal bleeding: Ms. Johnson is scheduled to have D&C with Dr. Gao ([facility]). They told her she will need an EKG. She exercises informally.  She can go up a full flight of steps without stopping for symptoms. She denies chest pain, dyspnea on exertion, orthopnea, leg swelling, loud snoring, witnessed apnea, excessive daytime sleepiness.   Her last surgery with exposure to anesthesia was colonoscopy in 2018.  She has no history of anesthesia reactions, blood clots, latex allergy.       Patient Active Problem List   Diagnosis    Hemorrhoids    Prediabetes    Essential hypertension    Migraines    Environmental allergies    Leukocytosis, unspecified type    Severe obesity (HCC)    Mixed hyperlipidemia    Neuralgia    Chronic gastritis without bleeding, unspecified gastritis type        Past Medical History:   Diagnosis Date    Environmental allergies     Hemorrhoids     Banded    Obesity        Past Surgical History:   Procedure Laterality Date     SECTION      x3    COLONOSCOPY  2018    TUBAL LIGATION         Family History   Problem Relation Age of Onset    Stroke Mother     Elevated Lipids Mother     Heart Disease Father         heart attack    Diabetes Father     Hypertension Father     Cancer Maternal Grandmother

## 2024-08-26 ENCOUNTER — TELEPHONE (OUTPATIENT)
Age: 64
End: 2024-08-26

## 2024-08-26 NOTE — TELEPHONE ENCOUNTER
Called patient to schedule new patient appointment. Patient stated that she wanted to know what this appointment was for and why she needed to be seen. I informed patient that if she wanted to know why she was being referred to our office she would have to call her referring provider to get the information.

## 2024-10-28 ENCOUNTER — TELEPHONE (OUTPATIENT)
Age: 64
End: 2024-10-28

## 2024-10-28 NOTE — TELEPHONE ENCOUNTER
Patient called and stated that she wanted to cancel her NP appt in November due to her already being diagnosed with cancer and going in for surgery.

## 2025-01-08 ENCOUNTER — HOSPITAL ENCOUNTER (OUTPATIENT)
Facility: HOSPITAL | Age: 65
Discharge: HOME | End: 2025-01-11
Payer: COMMERCIAL

## 2025-01-08 ENCOUNTER — OFFICE VISIT (OUTPATIENT)
Facility: CLINIC | Age: 65
End: 2025-01-08
Payer: COMMERCIAL

## 2025-01-08 VITALS
BODY MASS INDEX: 38.58 KG/M2 | DIASTOLIC BLOOD PRESSURE: 79 MMHG | RESPIRATION RATE: 14 BRPM | OXYGEN SATURATION: 94 % | HEIGHT: 64 IN | HEART RATE: 73 BPM | SYSTOLIC BLOOD PRESSURE: 133 MMHG | WEIGHT: 226 LBS

## 2025-01-08 DIAGNOSIS — D72.829 LEUKOCYTOSIS, UNSPECIFIED TYPE: ICD-10-CM

## 2025-01-08 DIAGNOSIS — I10 ESSENTIAL HYPERTENSION: ICD-10-CM

## 2025-01-08 DIAGNOSIS — M54.16 LUMBAR RADICULOPATHY: ICD-10-CM

## 2025-01-08 DIAGNOSIS — Z85.42 HISTORY OF ENDOMETRIAL CANCER: ICD-10-CM

## 2025-01-08 DIAGNOSIS — R73.03 PREDIABETES: ICD-10-CM

## 2025-01-08 DIAGNOSIS — I10 ESSENTIAL HYPERTENSION: Primary | ICD-10-CM

## 2025-01-08 DIAGNOSIS — E78.2 MIXED HYPERLIPIDEMIA: ICD-10-CM

## 2025-01-08 PROCEDURE — 3075F SYST BP GE 130 - 139MM HG: CPT | Performed by: INTERNAL MEDICINE

## 2025-01-08 PROCEDURE — 99214 OFFICE O/P EST MOD 30 MIN: CPT | Performed by: INTERNAL MEDICINE

## 2025-01-08 PROCEDURE — 3078F DIAST BP <80 MM HG: CPT | Performed by: INTERNAL MEDICINE

## 2025-01-08 PROCEDURE — 72100 X-RAY EXAM L-S SPINE 2/3 VWS: CPT

## 2025-01-08 RX ORDER — ROSUVASTATIN CALCIUM 20 MG/1
20 TABLET, COATED ORAL DAILY
Qty: 90 TABLET | Refills: 4 | Status: SHIPPED | OUTPATIENT
Start: 2025-01-08

## 2025-01-08 SDOH — ECONOMIC STABILITY: FOOD INSECURITY: WITHIN THE PAST 12 MONTHS, YOU WORRIED THAT YOUR FOOD WOULD RUN OUT BEFORE YOU GOT MONEY TO BUY MORE.: NEVER TRUE

## 2025-01-08 SDOH — ECONOMIC STABILITY: FOOD INSECURITY: WITHIN THE PAST 12 MONTHS, THE FOOD YOU BOUGHT JUST DIDN'T LAST AND YOU DIDN'T HAVE MONEY TO GET MORE.: NEVER TRUE

## 2025-01-08 ASSESSMENT — PATIENT HEALTH QUESTIONNAIRE - PHQ9
SUM OF ALL RESPONSES TO PHQ QUESTIONS 1-9: 0
SUM OF ALL RESPONSES TO PHQ9 QUESTIONS 1 & 2: 0
1. LITTLE INTEREST OR PLEASURE IN DOING THINGS: NOT AT ALL
2. FEELING DOWN, DEPRESSED OR HOPELESS: NOT AT ALL
SUM OF ALL RESPONSES TO PHQ QUESTIONS 1-9: 0

## 2025-01-08 NOTE — PROGRESS NOTES
refilled her hydrochlorothiazide prescription a few weeks ago.    She was diagnosed with endometrial cancer and underwent a D and C procedure. A CT scan was performed prior to the surgery to ensure there was no metastasis. She had a laparoscopic hysterectomy on 10/31/2024, during which her ovaries were also removed. She is still recovering from the surgery, which took place almost 10 weeks ago. She has completed 3 sessions of brachytherapy, with the final session occurring yesterday. Her CA-125 level has decreased from 27.2 to 17.3. She has been advised to continue follow-up appointments with Dr. Gerardo every few months. She recalls experiencing unusual sensations in her head prior to the surgery, akin to the feeling of brain freeze, but these have since resolved.    Left hip pain has been an issue for a long time, outer hip, all the way down to her foot, sometimes is numb in her foot. There is some lower back pain associated, no clicking or groin pain. She has to prop up at night, cannot lie flat.            Current Outpatient Medications:     Collagen-Vitamin C-Biotin (COLLAGEN PO), Take by mouth, Disp: , Rfl:     hydroCHLOROthiazide (HYDRODIURIL) 25 MG tablet, Take 1 tablet by mouth every morning, Disp: 90 tablet, Rfl: 4    Vitamin D-Vitamin K (K2 PLUS D3) 100-1000 MCG-UNIT TABS, Take by mouth, Disp: , Rfl:     rosuvastatin (CRESTOR) 20 MG tablet, Take 1 tablet by mouth daily, Disp: 90 tablet, Rfl: 4    Omega-3 Fatty Acids (FISH OIL) 1000 MG capsule, Take by mouth daily, Disp: , Rfl:     Multiple Vitamins-Minerals (MULTIVITAMIN ADULTS PO), Take by mouth, Disp: , Rfl:     coenzyme Q10 200 MG CAPS capsule, ceived the following from [facility]: Outside name: coenzyme Q-10 (CO Q-10) 200 mg capsule, Disp: , Rfl:     loratadine (CLARITIN) 10 MG tablet, Take 1 tablet by mouth daily, Disp: , Rfl:     No Known Allergies     /79 (Site: Left Upper Arm, Position: Sitting, Cuff Size: Small Adult)   Pulse

## 2025-01-09 LAB
ANION GAP SERPL CALC-SCNC: 6 MMOL/L (ref 2–12)
BASOPHILS # BLD: 0.05 K/UL (ref 0–0.1)
BASOPHILS NFR BLD: 0.5 % (ref 0–1)
BUN SERPL-MCNC: 11 MG/DL (ref 6–20)
BUN/CREAT SERPL: 12 (ref 12–20)
CALCIUM SERPL-MCNC: 9.9 MG/DL (ref 8.5–10.1)
CHLORIDE SERPL-SCNC: 104 MMOL/L (ref 97–108)
CO2 SERPL-SCNC: 27 MMOL/L (ref 21–32)
CREAT SERPL-MCNC: 0.95 MG/DL (ref 0.55–1.02)
DIFFERENTIAL METHOD BLD: ABNORMAL
EOSINOPHIL # BLD: 0.13 K/UL (ref 0–0.4)
EOSINOPHIL NFR BLD: 1.3 % (ref 0–7)
ERYTHROCYTE [DISTWIDTH] IN BLOOD BY AUTOMATED COUNT: 13.2 % (ref 11.5–14.5)
EST. AVERAGE GLUCOSE BLD GHB EST-MCNC: 117 MG/DL
GLUCOSE SERPL-MCNC: 89 MG/DL (ref 65–100)
HBA1C MFR BLD: 5.7 % (ref 4–5.6)
HCT VFR BLD AUTO: 42 % (ref 35–47)
HGB BLD-MCNC: 13.5 G/DL (ref 11.5–16)
IMM GRANULOCYTES # BLD AUTO: 0.03 K/UL (ref 0–0.04)
IMM GRANULOCYTES NFR BLD AUTO: 0.3 % (ref 0–0.5)
LYMPHOCYTES # BLD: 5.08 K/UL (ref 0.8–3.5)
LYMPHOCYTES NFR BLD: 49.9 % (ref 12–49)
MCH RBC QN AUTO: 29.6 PG (ref 26–34)
MCHC RBC AUTO-ENTMCNC: 32.1 G/DL (ref 30–36.5)
MCV RBC AUTO: 92.1 FL (ref 80–99)
MONOCYTES # BLD: 0.94 K/UL (ref 0–1)
MONOCYTES NFR BLD: 9.2 % (ref 5–13)
NEUTS SEG # BLD: 3.95 K/UL (ref 1.8–8)
NEUTS SEG NFR BLD: 38.8 % (ref 32–75)
NRBC # BLD: 0 K/UL (ref 0–0.01)
NRBC BLD-RTO: 0 PER 100 WBC
PLATELET # BLD AUTO: 272 K/UL (ref 150–400)
PMV BLD AUTO: 10.3 FL (ref 8.9–12.9)
POTASSIUM SERPL-SCNC: 4 MMOL/L (ref 3.5–5.1)
RBC # BLD AUTO: 4.56 M/UL (ref 3.8–5.2)
SODIUM SERPL-SCNC: 137 MMOL/L (ref 136–145)
WBC # BLD AUTO: 10.2 K/UL (ref 3.6–11)

## 2025-07-09 ENCOUNTER — OFFICE VISIT (OUTPATIENT)
Facility: CLINIC | Age: 65
End: 2025-07-09
Payer: COMMERCIAL

## 2025-07-09 VITALS
WEIGHT: 234.4 LBS | HEIGHT: 64 IN | DIASTOLIC BLOOD PRESSURE: 84 MMHG | SYSTOLIC BLOOD PRESSURE: 129 MMHG | HEART RATE: 73 BPM | OXYGEN SATURATION: 95 % | BODY MASS INDEX: 40.02 KG/M2

## 2025-07-09 DIAGNOSIS — I10 ESSENTIAL HYPERTENSION: Primary | ICD-10-CM

## 2025-07-09 DIAGNOSIS — I10 ESSENTIAL HYPERTENSION: ICD-10-CM

## 2025-07-09 DIAGNOSIS — R21 RASH: ICD-10-CM

## 2025-07-09 DIAGNOSIS — M85.89 OTHER SPECIFIED DISORDERS OF BONE DENSITY AND STRUCTURE, MULTIPLE SITES: ICD-10-CM

## 2025-07-09 DIAGNOSIS — E78.2 MIXED HYPERLIPIDEMIA: ICD-10-CM

## 2025-07-09 PROCEDURE — 99214 OFFICE O/P EST MOD 30 MIN: CPT | Performed by: INTERNAL MEDICINE

## 2025-07-09 PROCEDURE — 1123F ACP DISCUSS/DSCN MKR DOCD: CPT | Performed by: INTERNAL MEDICINE

## 2025-07-09 PROCEDURE — 3074F SYST BP LT 130 MM HG: CPT | Performed by: INTERNAL MEDICINE

## 2025-07-09 PROCEDURE — 3079F DIAST BP 80-89 MM HG: CPT | Performed by: INTERNAL MEDICINE

## 2025-07-09 NOTE — PROGRESS NOTES
MCG-UNIT TABS Take by mouth          No Known Allergies     /84 (BP Site: Left Upper Arm, Patient Position: Sitting, BP Cuff Size: Medium Adult)   Pulse 73   Ht 1.626 m (5' 4\")   Wt 106.3 kg (234 lb 6.4 oz)   SpO2 95%   BMI 40.23 kg/m²      General: well nourished, well appearing, in no distress  Heart: regular, normal rate, no murmurs noted, distal pulses 2+ bilaterally, no pitting peripheral edema  Lungs: good air movement, clear to auscultation bilaterally, no wheezing or rales noted           This note was created with the help of speech recognition software (Dragon) and may contain some 'sound alike' errors.       The patient (or guardian, if applicable) and other individuals in attendance with the patient were advised that Artificial Intelligence will be utilized during this visit to record, process the conversation to generate a clinical note, and support improvement of the AI technology. The patient (or guardian, if applicable) and other individuals in attendance at the appointment consented to the use of AI, including the recording.

## 2025-07-10 LAB
ALBUMIN SERPL-MCNC: 4.4 G/DL (ref 3.5–5)
ALBUMIN/GLOB SERPL: 1.1 (ref 1.1–2.2)
ALP SERPL-CCNC: 62 U/L (ref 45–117)
ALT SERPL-CCNC: 27 U/L (ref 12–78)
ANION GAP SERPL CALC-SCNC: 6 MMOL/L (ref 2–12)
AST SERPL-CCNC: 18 U/L (ref 15–37)
BILIRUB SERPL-MCNC: 0.5 MG/DL (ref 0.2–1)
BUN SERPL-MCNC: 10 MG/DL (ref 6–20)
BUN/CREAT SERPL: 10 (ref 12–20)
CALCIUM SERPL-MCNC: 10 MG/DL (ref 8.5–10.1)
CHLORIDE SERPL-SCNC: 105 MMOL/L (ref 97–108)
CHOLEST SERPL-MCNC: 189 MG/DL
CO2 SERPL-SCNC: 30 MMOL/L (ref 21–32)
CREAT SERPL-MCNC: 0.99 MG/DL (ref 0.55–1.02)
GLOBULIN SER CALC-MCNC: 3.9 G/DL (ref 2–4)
GLUCOSE SERPL-MCNC: 74 MG/DL (ref 65–100)
HDLC SERPL-MCNC: 70 MG/DL
HDLC SERPL: 2.7 (ref 0–5)
LDLC SERPL CALC-MCNC: 92.2 MG/DL (ref 0–100)
POTASSIUM SERPL-SCNC: 3.9 MMOL/L (ref 3.5–5.1)
PROT SERPL-MCNC: 8.3 G/DL (ref 6.4–8.2)
SODIUM SERPL-SCNC: 141 MMOL/L (ref 136–145)
TRIGL SERPL-MCNC: 134 MG/DL
VLDLC SERPL CALC-MCNC: 26.8 MG/DL

## 2025-07-11 LAB
HSV1 IGG SER IA-ACNC: NON REACTIVE
HSV2 IGG SER IA-ACNC: REACTIVE

## 2025-09-03 ENCOUNTER — HOSPITAL ENCOUNTER (OUTPATIENT)
Facility: HOSPITAL | Age: 65
Discharge: HOME | End: 2025-09-06
Attending: INTERNAL MEDICINE
Payer: COMMERCIAL

## 2025-09-03 DIAGNOSIS — M85.89 OTHER SPECIFIED DISORDERS OF BONE DENSITY AND STRUCTURE, MULTIPLE SITES: ICD-10-CM

## 2025-09-03 PROCEDURE — 77080 DXA BONE DENSITY AXIAL: CPT
